# Patient Record
Sex: FEMALE | Race: OTHER | HISPANIC OR LATINO
[De-identification: names, ages, dates, MRNs, and addresses within clinical notes are randomized per-mention and may not be internally consistent; named-entity substitution may affect disease eponyms.]

---

## 2018-03-21 PROBLEM — Z00.00 ENCOUNTER FOR PREVENTIVE HEALTH EXAMINATION: Status: ACTIVE | Noted: 2018-03-21

## 2018-03-27 ENCOUNTER — APPOINTMENT (OUTPATIENT)
Dept: SURGERY | Facility: CLINIC | Age: 63
End: 2018-03-27
Payer: MEDICAID

## 2018-03-27 VITALS
HEIGHT: 61 IN | BODY MASS INDEX: 44.06 KG/M2 | SYSTOLIC BLOOD PRESSURE: 126 MMHG | TEMPERATURE: 97.6 F | DIASTOLIC BLOOD PRESSURE: 87 MMHG | HEART RATE: 71 BPM | WEIGHT: 233.38 LBS | OXYGEN SATURATION: 97 %

## 2018-03-27 DIAGNOSIS — Z87.891 PERSONAL HISTORY OF NICOTINE DEPENDENCE: ICD-10-CM

## 2018-03-27 DIAGNOSIS — Z72.3 LACK OF PHYSICAL EXERCISE: ICD-10-CM

## 2018-03-27 PROCEDURE — 99204 OFFICE O/P NEW MOD 45 MIN: CPT

## 2018-03-27 RX ORDER — VALSARTAN 40 MG/1
TABLET ORAL
Refills: 0 | Status: ACTIVE | COMMUNITY

## 2018-03-27 RX ORDER — ASPIRIN 81 MG/1
81 TABLET ORAL
Refills: 0 | Status: ACTIVE | COMMUNITY

## 2018-08-14 ENCOUNTER — APPOINTMENT (OUTPATIENT)
Dept: SURGERY | Facility: CLINIC | Age: 63
End: 2018-08-14
Payer: MEDICAID

## 2018-08-14 VITALS
TEMPERATURE: 97.8 F | OXYGEN SATURATION: 96 % | BODY MASS INDEX: 45.12 KG/M2 | DIASTOLIC BLOOD PRESSURE: 87 MMHG | WEIGHT: 239 LBS | HEART RATE: 76 BPM | HEIGHT: 61 IN | SYSTOLIC BLOOD PRESSURE: 136 MMHG

## 2018-08-14 PROCEDURE — 99213 OFFICE O/P EST LOW 20 MIN: CPT

## 2018-08-21 ENCOUNTER — APPOINTMENT (OUTPATIENT)
Dept: SURGERY | Facility: CLINIC | Age: 63
End: 2018-08-21

## 2018-08-28 ENCOUNTER — APPOINTMENT (OUTPATIENT)
Dept: SURGERY | Facility: CLINIC | Age: 63
End: 2018-08-28

## 2018-09-26 ENCOUNTER — OTHER (OUTPATIENT)
Age: 63
End: 2018-09-26

## 2018-11-13 ENCOUNTER — OUTPATIENT (OUTPATIENT)
Dept: OUTPATIENT SERVICES | Facility: HOSPITAL | Age: 63
LOS: 1 days | End: 2018-11-13
Payer: COMMERCIAL

## 2018-11-13 ENCOUNTER — APPOINTMENT (OUTPATIENT)
Dept: SURGERY | Facility: CLINIC | Age: 63
End: 2018-11-13

## 2018-11-13 VITALS — WEIGHT: 242.25 LBS | BODY MASS INDEX: 45.77 KG/M2

## 2018-11-13 PROCEDURE — 71046 X-RAY EXAM CHEST 2 VIEWS: CPT | Mod: 26

## 2018-11-13 PROCEDURE — 71046 X-RAY EXAM CHEST 2 VIEWS: CPT

## 2018-11-29 ENCOUNTER — LABORATORY RESULT (OUTPATIENT)
Age: 63
End: 2018-11-29

## 2018-11-29 ENCOUNTER — APPOINTMENT (OUTPATIENT)
Dept: SURGERY | Facility: CLINIC | Age: 63
End: 2018-11-29
Payer: MEDICAID

## 2018-11-29 VITALS — BODY MASS INDEX: 45.16 KG/M2 | WEIGHT: 239 LBS

## 2018-11-29 PROCEDURE — 97802 MEDICAL NUTRITION INDIV IN: CPT

## 2018-12-07 ENCOUNTER — APPOINTMENT (OUTPATIENT)
Dept: RADIOLOGY | Facility: HOSPITAL | Age: 63
End: 2018-12-07
Payer: MEDICAID

## 2018-12-07 ENCOUNTER — OUTPATIENT (OUTPATIENT)
Dept: OUTPATIENT SERVICES | Facility: HOSPITAL | Age: 63
LOS: 1 days | End: 2018-12-07
Payer: COMMERCIAL

## 2018-12-07 ENCOUNTER — APPOINTMENT (OUTPATIENT)
Dept: ULTRASOUND IMAGING | Facility: HOSPITAL | Age: 63
End: 2018-12-07
Payer: MEDICAID

## 2018-12-07 PROCEDURE — 76700 US EXAM ABDOM COMPLETE: CPT | Mod: 26

## 2018-12-07 PROCEDURE — 74241: CPT | Mod: 26

## 2018-12-07 PROCEDURE — 74241: CPT

## 2018-12-07 PROCEDURE — 76700 US EXAM ABDOM COMPLETE: CPT

## 2019-02-08 ENCOUNTER — APPOINTMENT (OUTPATIENT)
Dept: SURGERY | Facility: CLINIC | Age: 64
End: 2019-02-08
Payer: MEDICAID

## 2019-02-08 VITALS
SYSTOLIC BLOOD PRESSURE: 130 MMHG | WEIGHT: 241 LBS | BODY MASS INDEX: 45.5 KG/M2 | DIASTOLIC BLOOD PRESSURE: 88 MMHG | HEART RATE: 74 BPM | HEIGHT: 61 IN | OXYGEN SATURATION: 96 % | TEMPERATURE: 98.1 F

## 2019-02-08 PROCEDURE — 99213 OFFICE O/P EST LOW 20 MIN: CPT

## 2019-02-08 NOTE — ASSESSMENT
[FreeTextEntry1] : 62 y/o F with history of asthma, hypertension,  HLD, and VSG 8 years ago. Patient states she has completed the work-up for revision. Will review patient's work-up. Discussred surgical options with patient including conversion to gastric bypass and conversion to DS. Risk, benefits, and alternatives to the proposed procedures were discussed with the patient and all questions were answered to their satisfaction. She would like to proceed with gastric bypass. \par

## 2019-02-08 NOTE — HISTORY OF PRESENT ILLNESS
[de-identified] : 62 y/o F with history of asthma, hypertension,  HLD, and VSG 8 years ago, in the process of completing work-up for revision due to weight gain.  Patient states she has completed her bariatric work-up. She is feeling well. No medical complaints at this time.

## 2019-02-08 NOTE — END OF VISIT
[FreeTextEntry3] : All medical record entries made by the Scribe were at my, Dr. Sanders's direction and personally dictated by me on 2/8/2019. I have reviewed the chart and agree that the record accurately reflects my personal performance of the history, physical exam, assessment and plan. I have also personally directed, reviewed, and agreed with the chart.

## 2019-04-16 ENCOUNTER — APPOINTMENT (OUTPATIENT)
Dept: SURGERY | Facility: CLINIC | Age: 64
End: 2019-04-16
Payer: MEDICAID

## 2019-04-16 VITALS
HEIGHT: 61 IN | SYSTOLIC BLOOD PRESSURE: 136 MMHG | OXYGEN SATURATION: 97 % | DIASTOLIC BLOOD PRESSURE: 86 MMHG | HEART RATE: 76 BPM | TEMPERATURE: 98.8 F | BODY MASS INDEX: 45.74 KG/M2 | WEIGHT: 242.25 LBS

## 2019-04-16 PROCEDURE — 99213 OFFICE O/P EST LOW 20 MIN: CPT

## 2019-04-17 NOTE — HISTORY OF PRESENT ILLNESS
[de-identified] : Pt is a 63 y/o F with pmhx of prediabetes, HTN, with weight regain who presents today feeling well. Pt states that she has completed all of her workup process for surgery and her weigh ins. Pt is ready to be scheduled for conversion of sleeve to bypass surgery. Pt is to attend our pre op class in 2 days. All Bariatric work-up has been completed and revised, surgery scheduled for 5/09/19.

## 2019-04-17 NOTE — PLAN
[FreeTextEntry1] : Pt is to attend pre op class in 2 days. Scheduled for conversion of sleeve to bypass on 5/09/19. All Bariatric work-up is complete.

## 2019-05-16 ENCOUNTER — APPOINTMENT (OUTPATIENT)
Dept: BARIATRICS | Facility: CLINIC | Age: 64
End: 2019-05-16

## 2019-05-16 VITALS — WEIGHT: 246.38 LBS | HEIGHT: 61 IN | BODY MASS INDEX: 46.52 KG/M2

## 2019-05-28 ENCOUNTER — APPOINTMENT (OUTPATIENT)
Dept: SURGERY | Facility: CLINIC | Age: 64
End: 2019-05-28
Payer: MEDICAID

## 2019-05-28 VITALS
HEART RATE: 82 BPM | BODY MASS INDEX: 45.57 KG/M2 | DIASTOLIC BLOOD PRESSURE: 86 MMHG | WEIGHT: 241.38 LBS | HEIGHT: 61 IN | TEMPERATURE: 97.8 F | OXYGEN SATURATION: 96 % | SYSTOLIC BLOOD PRESSURE: 119 MMHG

## 2019-05-28 DIAGNOSIS — J45.909 UNSPECIFIED ASTHMA, UNCOMPLICATED: ICD-10-CM

## 2019-05-28 PROCEDURE — 99213 OFFICE O/P EST LOW 20 MIN: CPT

## 2019-05-28 NOTE — END OF VISIT
[FreeTextEntry3] : All medical record entries made by the Scribe were at my, Dr. Sanders's direction and personally dictated by me on 05/28/2019  I have reviewed the chart and agree that the record accurately reflects my personal performance of the history, physical exam, assessment and plan. I have also personally directed, reviewed, and agreed with the chart.\par

## 2019-05-28 NOTE — ADDENDUM
[FreeTextEntry1] : Documented by Geno Quiroga acting as a scribe for Dr. Dawit Sanders on 05/28/2019\par

## 2019-05-28 NOTE — ASSESSMENT
[FreeTextEntry1] : 63 y/o F with pmhx of prediabetes, asthma, HLD, HTN, and weight regain s/p VSG 8 years ago presents here with questions prior to bariatric surgery. She is doing well. Explained to patient that the excess fat on her extremities should not be a priority concern and that her central obesity is a larger and more concerning issue that should be dealt with. Pt was given the option of DS or gastric bypass however, DS was not recommended due to high risk of malnutrition from malabsorption. Gastric bypass is more suitable for the pt given her multiple comorbidities with the addition of lower risks for post-op complications like malabsorption. Pt will also see a pulmonologist to assess if her lung function is suitable to undergo surgery. Will reschedule her bariatric surgery.

## 2019-05-28 NOTE — HISTORY OF PRESENT ILLNESS
[de-identified] : 65 y/o F with pmhx of prediabetes,, asthma, HLD, HTN, and weight regain s/p VSG 8 years ago presents here with questions prior to bariatric surgery. She reports doing well. She has also finished all of her work up. Pt has questions regarding the excess fat on her extremities and was wondering if the surgery would help reduce the fat on her extremities. Pt also has concerns about wheezing/ breathing problems post op as she experienced s/p VSG 8 years ago.

## 2019-06-18 ENCOUNTER — APPOINTMENT (OUTPATIENT)
Age: 64
End: 2019-06-18
Payer: MEDICAID

## 2019-06-18 VITALS
DIASTOLIC BLOOD PRESSURE: 77 MMHG | OXYGEN SATURATION: 96 % | BODY MASS INDEX: 46.53 KG/M2 | TEMPERATURE: 98.4 F | HEIGHT: 61 IN | WEIGHT: 246.44 LBS | SYSTOLIC BLOOD PRESSURE: 113 MMHG | HEART RATE: 69 BPM

## 2019-06-18 PROCEDURE — 99213 OFFICE O/P EST LOW 20 MIN: CPT

## 2019-06-18 NOTE — END OF VISIT
[FreeTextEntry3] : All medical record entries made by the Scribe were at my, Dr. Sanders's direction and personally dictated by me on 06/18/2019  I have reviewed the chart and agree that the record accurately reflects my personal performance of the history, physical exam, assessment and plan. I have also personally directed, reviewed, and agreed with the chart.\par \par

## 2019-06-18 NOTE — ASSESSMENT
[FreeTextEntry1] : 63 y/o F with pmhx of prediabetes, asthma, HLD, HTN, and weight regain s/p VSG 8 years ago presents here for final visit prior to surgery. She has completed all of her work up and weigh ins. She has obtained pulmonary clearance and presents today with medical clearance from her PCP. She is ready to be scheduled for Sleeve conversion to bypass.

## 2019-06-18 NOTE — ADDENDUM
[FreeTextEntry1] : Documented by Geno Quiroga acting as a scribe for Dr. Dawit Sanders on 06/18/2019\par

## 2019-06-18 NOTE — HISTORY OF PRESENT ILLNESS
[de-identified] : 65 y/o F with pmhx of prediabetes, asthma, HLD, HTN, and weight regain s/p VSG 8 years ago presents here for a final visit. She reports feeling well today. She presents to the clinic with a letter of support from her PCP. She is ready to be scheduled for bariatric surgery.

## 2019-06-21 VITALS
OXYGEN SATURATION: 95 % | WEIGHT: 240.97 LBS | DIASTOLIC BLOOD PRESSURE: 93 MMHG | HEART RATE: 82 BPM | SYSTOLIC BLOOD PRESSURE: 156 MMHG | HEIGHT: 61 IN | TEMPERATURE: 98 F | RESPIRATION RATE: 18 BRPM

## 2019-06-21 NOTE — PATIENT PROFILE ADULT - NSPROHMRESPMGMTSTRAT_GEN_A_NUR
Name Of Product 3: Melasa Emulsion 8%.  Lot# [id number]. Exp. 11/19/17 Name Of Product 3: Melasa Emulsion 8%.  Lot# [id number]. Exp. 11/19/17 CPAP

## 2019-06-24 ENCOUNTER — RESULT REVIEW (OUTPATIENT)
Age: 64
End: 2019-06-24

## 2019-06-24 ENCOUNTER — INPATIENT (INPATIENT)
Facility: HOSPITAL | Age: 64
LOS: 4 days | Discharge: HOME | DRG: 620 | End: 2019-06-29
Attending: SURGERY | Admitting: SURGERY
Payer: MEDICAID

## 2019-06-24 ENCOUNTER — APPOINTMENT (OUTPATIENT)
Age: 64
End: 2019-06-24
Payer: MEDICAID

## 2019-06-24 DIAGNOSIS — Z90.3 ACQUIRED ABSENCE OF STOMACH [PART OF]: Chronic | ICD-10-CM

## 2019-06-24 DIAGNOSIS — Z98.890 OTHER SPECIFIED POSTPROCEDURAL STATES: Chronic | ICD-10-CM

## 2019-06-24 DIAGNOSIS — K43.2 INCISIONAL HERNIA WITHOUT OBSTRUCTION OR GANGRENE: ICD-10-CM

## 2019-06-24 LAB
HCT VFR BLD CALC: 40.2 % — SIGNIFICANT CHANGE UP (ref 34.5–45)
HGB BLD-MCNC: 12.5 G/DL — SIGNIFICANT CHANGE UP (ref 11.5–15.5)
MCHC RBC-ENTMCNC: 28.5 PG — SIGNIFICANT CHANGE UP (ref 27–34)
MCHC RBC-ENTMCNC: 31.1 GM/DL — LOW (ref 32–36)
MCV RBC AUTO: 91.6 FL — SIGNIFICANT CHANGE UP (ref 80–100)
NRBC # BLD: 0 /100 WBCS — SIGNIFICANT CHANGE UP (ref 0–0)
PLATELET # BLD AUTO: 223 K/UL — SIGNIFICANT CHANGE UP (ref 150–400)
RBC # BLD: 4.39 M/UL — SIGNIFICANT CHANGE UP (ref 3.8–5.2)
RBC # FLD: 12.4 % — SIGNIFICANT CHANGE UP (ref 10.3–14.5)
WBC # BLD: 13.86 K/UL — HIGH (ref 3.8–10.5)
WBC # FLD AUTO: 13.86 K/UL — HIGH (ref 3.8–10.5)

## 2019-06-24 PROCEDURE — S2900 ROBOTIC SURGICAL SYSTEM: CPT | Mod: NC

## 2019-06-24 PROCEDURE — 43645 LAP GASTR BYPASS INCL SMLL I: CPT | Mod: GC

## 2019-06-24 RX ORDER — IPRATROPIUM/ALBUTEROL SULFATE 18-103MCG
3 AEROSOL WITH ADAPTER (GRAM) INHALATION EVERY 6 HOURS
Refills: 0 | Status: DISCONTINUED | OUTPATIENT
Start: 2019-06-24 | End: 2019-06-29

## 2019-06-24 RX ORDER — SODIUM CHLORIDE 9 MG/ML
1000 INJECTION, SOLUTION INTRAVENOUS
Refills: 0 | Status: DISCONTINUED | OUTPATIENT
Start: 2019-06-24 | End: 2019-06-26

## 2019-06-24 RX ORDER — PANTOPRAZOLE SODIUM 20 MG/1
40 TABLET, DELAYED RELEASE ORAL DAILY
Refills: 0 | Status: DISCONTINUED | OUTPATIENT
Start: 2019-06-24 | End: 2019-06-29

## 2019-06-24 RX ORDER — ENOXAPARIN SODIUM 100 MG/ML
40 INJECTION SUBCUTANEOUS EVERY 12 HOURS
Refills: 0 | Status: DISCONTINUED | OUTPATIENT
Start: 2019-06-24 | End: 2019-06-26

## 2019-06-24 RX ORDER — ACETAMINOPHEN 500 MG
1000 TABLET ORAL ONCE
Refills: 0 | Status: COMPLETED | OUTPATIENT
Start: 2019-06-24 | End: 2019-06-25

## 2019-06-24 RX ORDER — ENOXAPARIN SODIUM 100 MG/ML
40 INJECTION SUBCUTANEOUS ONCE
Refills: 0 | Status: COMPLETED | OUTPATIENT
Start: 2019-06-24 | End: 2019-06-24

## 2019-06-24 RX ORDER — ALBUTEROL 90 UG/1
1 AEROSOL, METERED ORAL EVERY 4 HOURS
Refills: 0 | Status: DISCONTINUED | OUTPATIENT
Start: 2019-06-24 | End: 2019-06-29

## 2019-06-24 RX ORDER — ACETAMINOPHEN 500 MG
1000 TABLET ORAL ONCE
Refills: 0 | Status: COMPLETED | OUTPATIENT
Start: 2019-06-25 | End: 2019-06-25

## 2019-06-24 RX ORDER — HYDROMORPHONE HYDROCHLORIDE 2 MG/ML
0.5 INJECTION INTRAMUSCULAR; INTRAVENOUS; SUBCUTANEOUS
Refills: 0 | Status: DISCONTINUED | OUTPATIENT
Start: 2019-06-24 | End: 2019-06-28

## 2019-06-24 RX ORDER — HYDROMORPHONE HYDROCHLORIDE 2 MG/ML
0.5 INJECTION INTRAMUSCULAR; INTRAVENOUS; SUBCUTANEOUS
Refills: 0 | Status: DISCONTINUED | OUTPATIENT
Start: 2019-06-24 | End: 2019-06-24

## 2019-06-24 RX ORDER — ONDANSETRON 8 MG/1
4 TABLET, FILM COATED ORAL EVERY 6 HOURS
Refills: 0 | Status: DISCONTINUED | OUTPATIENT
Start: 2019-06-24 | End: 2019-06-29

## 2019-06-24 RX ORDER — SCOPALAMINE 1 MG/3D
1 PATCH, EXTENDED RELEASE TRANSDERMAL ONCE
Refills: 0 | Status: COMPLETED | OUTPATIENT
Start: 2019-06-24 | End: 2019-06-24

## 2019-06-24 RX ADMIN — HYDROMORPHONE HYDROCHLORIDE 0.5 MILLIGRAM(S): 2 INJECTION INTRAMUSCULAR; INTRAVENOUS; SUBCUTANEOUS at 20:44

## 2019-06-24 RX ADMIN — ENOXAPARIN SODIUM 40 MILLIGRAM(S): 100 INJECTION SUBCUTANEOUS at 19:08

## 2019-06-24 RX ADMIN — HYDROMORPHONE HYDROCHLORIDE 0.5 MILLIGRAM(S): 2 INJECTION INTRAMUSCULAR; INTRAVENOUS; SUBCUTANEOUS at 21:05

## 2019-06-24 RX ADMIN — PANTOPRAZOLE SODIUM 40 MILLIGRAM(S): 20 TABLET, DELAYED RELEASE ORAL at 16:54

## 2019-06-24 RX ADMIN — ENOXAPARIN SODIUM 40 MILLIGRAM(S): 100 INJECTION SUBCUTANEOUS at 08:14

## 2019-06-24 RX ADMIN — SCOPALAMINE 1 PATCH: 1 PATCH, EXTENDED RELEASE TRANSDERMAL at 19:09

## 2019-06-24 RX ADMIN — SCOPALAMINE 1 PATCH: 1 PATCH, EXTENDED RELEASE TRANSDERMAL at 08:14

## 2019-06-24 RX ADMIN — Medication 3 MILLILITER(S): at 18:14

## 2019-06-24 NOTE — BRIEF OPERATIVE NOTE - NSICDXBRIEFPROCEDURE_GEN_ALL_CORE_FT
PROCEDURES:  Repair, hernia, incisional, open, adult 24-Jun-2019 13:07:17  Ryan Petit  Robot-assisted laparoscopic Seamus-en-Y gastric bypass 24-Jun-2019 13:06:54 revision of sleeve gastrectomy Ryan Petit

## 2019-06-24 NOTE — BRIEF OPERATIVE NOTE - OPERATION/FINDINGS
Findings of sleeve gastrectomy with adhesions to left liver lobe and diaphragm. Gastric pouch created over 32 Fr bougie using blue load robotic stapler. Fundus resected. Hiatus closed with permanent suture. Ante-colic, retro-gastric gastrojejunostomy done in two layers hand sewn with 2-0 Ethibond. Seamus limb 150 cm, BP limb 150 cm, mesenteric defects closed with permanent sutures. Negative leak test.   Incisional hernia at umbilical port site identified and repaired primarily with 0-Vicryl. Contents - incarcerated omentum - resected.

## 2019-06-24 NOTE — PROGRESS NOTE ADULT - SUBJECTIVE AND OBJECTIVE BOX
POST-OPERATIVE NOTE    Procedure: Robot-assisted laparoscopic Seamus-en-Y gastric bypass  revision of sleeve gastrectomy     Diagnosis/Indication: Morbid obesity     Surgeon: Dr. Sanders    S: Pt has no complaints. Denies CP, SOB, MUSA, calf tenderness. Pain controlled with medication.    O:  T(C): 37 (06-24-19 @ 13:00), Max: 37 (06-24-19 @ 13:00)  T(F): 98.6 (06-24-19 @ 13:00), Max: 98.6 (06-24-19 @ 13:00)  HR: 60 (06-24-19 @ 16:00) (60 - 80)  BP: 130/74 (06-24-19 @ 16:00) (95/60 - 130/74)  RR: 20 (06-24-19 @ 16:00) (10 - 27)  SpO2: 97% (06-24-19 @ 16:00) (90% - 97%)  Wt(kg): --            Gen: NAD, resting comfortably in bed  C/V: NSR  Pulm: Nonlabored breathing, no respiratory distress  Abd: soft, non distended , TTP around incision site , incision clean dry and intact   Extrem: WWP, no calf edema, SCDs in place

## 2019-06-24 NOTE — PROGRESS NOTE ADULT - ASSESSMENT
63 yo F with morbid obesity - BMI 46, and associated pre-diabetes, HLD, HTN, asthma, and history of sleeve gastrectomy ~ 2011, scheduled for conversion to gastric bypass. Patient s/p Robot-assisted laparoscopic Seamus-en-Y gastric bypass  revision of sleeve gastrectomy   -Pain/nausea control  -BCLD, IVF  -Protonix  -CBC 6hrs post-op   -Lovenox DVT ppx   -SCDs, OOB/A, IS  -AM labs  -Nutritional consult in AM

## 2019-06-24 NOTE — H&P ADULT - HISTORY OF PRESENT ILLNESS
65 yo F with morbid obesity - BMI 46, and associated pre-diabetes, HLD, HTN, asthma, and history of sleeve gastrectomy ~ 2011, scheduled for conversion to gastric bypass

## 2019-06-24 NOTE — BRIEF OPERATIVE NOTE - NSICDXBRIEFPOSTOP_GEN_ALL_CORE_FT
POST-OP DIAGNOSIS:  Incisional hernia 24-Jun-2019 13:08:14  Ryan Petit  Morbid obesity 24-Jun-2019 13:07:46  Ryan Petit

## 2019-06-25 LAB
ANION GAP SERPL CALC-SCNC: 9 MMOL/L — SIGNIFICANT CHANGE UP (ref 5–17)
BASOPHILS # BLD AUTO: 0.01 K/UL — SIGNIFICANT CHANGE UP (ref 0–0.2)
BASOPHILS NFR BLD AUTO: 0.1 % — SIGNIFICANT CHANGE UP (ref 0–2)
BUN SERPL-MCNC: 10 MG/DL — SIGNIFICANT CHANGE UP (ref 7–23)
CALCIUM SERPL-MCNC: 8.8 MG/DL — SIGNIFICANT CHANGE UP (ref 8.4–10.5)
CHLORIDE SERPL-SCNC: 101 MMOL/L — SIGNIFICANT CHANGE UP (ref 96–108)
CO2 SERPL-SCNC: 32 MMOL/L — HIGH (ref 22–31)
CREAT SERPL-MCNC: 0.48 MG/DL — LOW (ref 0.5–1.3)
EOSINOPHIL # BLD AUTO: 0 K/UL — SIGNIFICANT CHANGE UP (ref 0–0.5)
EOSINOPHIL NFR BLD AUTO: 0 % — SIGNIFICANT CHANGE UP (ref 0–6)
GLUCOSE SERPL-MCNC: 129 MG/DL — HIGH (ref 70–99)
HCT VFR BLD CALC: 36 % — SIGNIFICANT CHANGE UP (ref 34.5–45)
HCV AB S/CO SERPL IA: 0.04 S/CO — SIGNIFICANT CHANGE UP
HCV AB SERPL-IMP: SIGNIFICANT CHANGE UP
HGB BLD-MCNC: 11.3 G/DL — LOW (ref 11.5–15.5)
IMM GRANULOCYTES NFR BLD AUTO: 0.5 % — SIGNIFICANT CHANGE UP (ref 0–1.5)
LYMPHOCYTES # BLD AUTO: 0.99 K/UL — LOW (ref 1–3.3)
LYMPHOCYTES # BLD AUTO: 9.4 % — LOW (ref 13–44)
MAGNESIUM SERPL-MCNC: 2.2 MG/DL — SIGNIFICANT CHANGE UP (ref 1.6–2.6)
MCHC RBC-ENTMCNC: 28.8 PG — SIGNIFICANT CHANGE UP (ref 27–34)
MCHC RBC-ENTMCNC: 31.4 GM/DL — LOW (ref 32–36)
MCV RBC AUTO: 91.6 FL — SIGNIFICANT CHANGE UP (ref 80–100)
MONOCYTES # BLD AUTO: 1.34 K/UL — HIGH (ref 0–0.9)
MONOCYTES NFR BLD AUTO: 12.7 % — SIGNIFICANT CHANGE UP (ref 2–14)
NEUTROPHILS # BLD AUTO: 8.14 K/UL — HIGH (ref 1.8–7.4)
NEUTROPHILS NFR BLD AUTO: 77.3 % — HIGH (ref 43–77)
NRBC # BLD: 0 /100 WBCS — SIGNIFICANT CHANGE UP (ref 0–0)
PHOSPHATE SERPL-MCNC: 2.9 MG/DL — SIGNIFICANT CHANGE UP (ref 2.5–4.5)
PLATELET # BLD AUTO: 197 K/UL — SIGNIFICANT CHANGE UP (ref 150–400)
POTASSIUM SERPL-MCNC: 3.3 MMOL/L — LOW (ref 3.5–5.3)
POTASSIUM SERPL-SCNC: 3.3 MMOL/L — LOW (ref 3.5–5.3)
RBC # BLD: 3.93 M/UL — SIGNIFICANT CHANGE UP (ref 3.8–5.2)
RBC # FLD: 12.4 % — SIGNIFICANT CHANGE UP (ref 10.3–14.5)
SODIUM SERPL-SCNC: 142 MMOL/L — SIGNIFICANT CHANGE UP (ref 135–145)
WBC # BLD: 10.53 K/UL — HIGH (ref 3.8–10.5)
WBC # FLD AUTO: 10.53 K/UL — HIGH (ref 3.8–10.5)

## 2019-06-25 PROCEDURE — 74241: CPT | Mod: 26

## 2019-06-25 RX ORDER — POTASSIUM CHLORIDE 20 MEQ
40 PACKET (EA) ORAL ONCE
Refills: 0 | Status: COMPLETED | OUTPATIENT
Start: 2019-06-25 | End: 2019-06-25

## 2019-06-25 RX ORDER — POTASSIUM CHLORIDE 20 MEQ
10 PACKET (EA) ORAL
Refills: 0 | Status: COMPLETED | OUTPATIENT
Start: 2019-06-25 | End: 2019-06-25

## 2019-06-25 RX ORDER — ACETAMINOPHEN 500 MG
650 TABLET ORAL ONCE
Refills: 0 | Status: COMPLETED | OUTPATIENT
Start: 2019-06-25 | End: 2019-06-25

## 2019-06-25 RX ORDER — ACETAMINOPHEN 500 MG
1000 TABLET ORAL ONCE
Refills: 0 | Status: COMPLETED | OUTPATIENT
Start: 2019-06-26 | End: 2019-06-26

## 2019-06-25 RX ORDER — KETOROLAC TROMETHAMINE 30 MG/ML
15 SYRINGE (ML) INJECTION EVERY 6 HOURS
Refills: 0 | Status: DISCONTINUED | OUTPATIENT
Start: 2019-06-25 | End: 2019-06-28

## 2019-06-25 RX ORDER — LOSARTAN POTASSIUM 100 MG/1
100 TABLET, FILM COATED ORAL DAILY
Refills: 0 | Status: DISCONTINUED | OUTPATIENT
Start: 2019-06-25 | End: 2019-06-26

## 2019-06-25 RX ADMIN — Medication 3 MILLILITER(S): at 21:00

## 2019-06-25 RX ADMIN — Medication 3 MILLILITER(S): at 01:02

## 2019-06-25 RX ADMIN — Medication 100 MILLIEQUIVALENT(S): at 13:07

## 2019-06-25 RX ADMIN — LOSARTAN POTASSIUM 100 MILLIGRAM(S): 100 TABLET, FILM COATED ORAL at 13:06

## 2019-06-25 RX ADMIN — ENOXAPARIN SODIUM 40 MILLIGRAM(S): 100 INJECTION SUBCUTANEOUS at 06:01

## 2019-06-25 RX ADMIN — Medication 400 MILLIGRAM(S): at 01:01

## 2019-06-25 RX ADMIN — Medication 650 MILLIGRAM(S): at 19:15

## 2019-06-25 RX ADMIN — Medication 15 MILLIGRAM(S): at 23:48

## 2019-06-25 RX ADMIN — Medication 650 MILLIGRAM(S): at 18:40

## 2019-06-25 RX ADMIN — Medication 15 MILLIGRAM(S): at 23:18

## 2019-06-25 RX ADMIN — Medication 100 MILLIEQUIVALENT(S): at 14:30

## 2019-06-25 RX ADMIN — SCOPALAMINE 1 PATCH: 1 PATCH, EXTENDED RELEASE TRANSDERMAL at 18:13

## 2019-06-25 RX ADMIN — Medication 15 MILLIGRAM(S): at 12:40

## 2019-06-25 RX ADMIN — HYDROMORPHONE HYDROCHLORIDE 0.5 MILLIGRAM(S): 2 INJECTION INTRAMUSCULAR; INTRAVENOUS; SUBCUTANEOUS at 07:13

## 2019-06-25 RX ADMIN — Medication 1000 MILLIGRAM(S): at 01:25

## 2019-06-25 RX ADMIN — Medication 3 MILLILITER(S): at 06:01

## 2019-06-25 RX ADMIN — HYDROMORPHONE HYDROCHLORIDE 0.5 MILLIGRAM(S): 2 INJECTION INTRAMUSCULAR; INTRAVENOUS; SUBCUTANEOUS at 06:55

## 2019-06-25 RX ADMIN — Medication 40 MILLIEQUIVALENT(S): at 13:12

## 2019-06-25 RX ADMIN — SODIUM CHLORIDE 125 MILLILITER(S): 9 INJECTION, SOLUTION INTRAVENOUS at 21:47

## 2019-06-25 RX ADMIN — Medication 15 MILLIGRAM(S): at 12:05

## 2019-06-25 RX ADMIN — Medication 100 MILLIEQUIVALENT(S): at 11:07

## 2019-06-25 RX ADMIN — Medication 400 MILLIGRAM(S): at 23:18

## 2019-06-25 RX ADMIN — SCOPALAMINE 1 PATCH: 1 PATCH, EXTENDED RELEASE TRANSDERMAL at 06:05

## 2019-06-25 RX ADMIN — Medication 3 MILLILITER(S): at 13:08

## 2019-06-25 RX ADMIN — ENOXAPARIN SODIUM 40 MILLIGRAM(S): 100 INJECTION SUBCUTANEOUS at 19:40

## 2019-06-25 RX ADMIN — Medication 1000 MILLIGRAM(S): at 23:48

## 2019-06-25 RX ADMIN — PANTOPRAZOLE SODIUM 40 MILLIGRAM(S): 20 TABLET, DELAYED RELEASE ORAL at 13:06

## 2019-06-25 NOTE — DIETITIAN INITIAL EVALUATION ADULT. - ADD RECOMMEND
Bariatric PO diet progression/advancement. F/u with [facility] Bariatric Outpatient RD.

## 2019-06-25 NOTE — PROGRESS NOTE ADULT - ASSESSMENT
65 yo F with morbid obesity - BMI 46, and associated pre-diabetes, HLD, HTN, asthma, and history of sleeve gastrectomy ~ 2011, scheduled for conversion to gastric bypass. Patient s/p Robot-assisted laparoscopic Seamus-en-Y gastric bypass  revision of sleeve gastrectomy   -Pain/nausea control  -NPO, IVF  -Protonix  -CBC 6hrs post-op   -Lovenox DVT ppx   -SCDs, OOB/A, IS  -AM labs  -Nutritional consult in AM

## 2019-06-25 NOTE — DIETITIAN INITIAL EVALUATION ADULT. - OTHER INFO
64 y.o Morbid Obesity F from home with PMHx of pre-DM, HLD, HTN, Asthma, h/o Sleeve Gastrectomy 2011, s/p open repair hernia incisional, robot-assisted laparoscopic seamus-en-Y gastric bypass revision of sleeve gastrectomy 6/24. Pt was following regular diet, NKFA,  lbs per pt. Pt has protein shake/supplements and MVI at home. Pt started BRCLD, tolerating sips thus far well. Denies N/V/D/C or pain. +BM 6/21, awaiting BM. questionable flatus, not ambulating yet. Skin intact with surgical incisions. Hypokalemia 3.3 L, receiving LR noted. Nutrition edu provided to pt on Bariatric Seamus-En-Y diet progression with handouts in [place] language. Discussed tips/strategies with PO diet progression, hydration, protein/vitamins/minerals supplements. Pt verbalized understanding, no questions at this time, fair to good compliance expected. Pt to be f/u with [facility] Bariatric Outpatient RD. RD will f/u per high risk protocol.

## 2019-06-25 NOTE — CHART NOTE - NSCHARTNOTEFT_GEN_A_CORE
Upon Nutritional Assessment by the Registered Dietitian your patient was determined to meet criteria / has evidence of the following diagnosis/diagnoses:          [ ]  Mild Protein Calorie Malnutrition        [ ]  Moderate Protein Calorie Malnutrition        [ ] Severe Protein Calorie Malnutrition        [ ] Unspecified Protein Calorie Malnutrition        [ ] Underweight / BMI <19        [X] Morbid Obesity / BMI > 40      Findings as based on:  •  Comprehensive nutrition assessment and consultation    Treatment:    The following diet has been recommended:  Refer to initial nutrition assessment note    PROVIDER Section:     By signing this assessment you are acknowledging and agree with the diagnosis/diagnoses assigned by the Registered Dietitian    Mar Olsen RDN, MANUELAN, [facility]   Ext: [phone] or available via [employer]

## 2019-06-25 NOTE — PROGRESS NOTE ADULT - SUBJECTIVE AND OBJECTIVE BOX
STATUS POST:  Robot-assisted laparoscopic Seamus-en-Y gastric bypass  revision of sleeve gastrectomy      SUBJECTIVE: Patient seen and examined bedside by chief resident. complains of alot of abdominal pain. didnt sleep much due to pain     enoxaparin Injectable 40 milliGRAM(s) SubCutaneous every 12 hours      Vital Signs Last 24 Hrs  T(C): 36.9 (25 Jun 2019 05:04), Max: 37.6 (24 Jun 2019 18:08)  T(F): 98.4 (25 Jun 2019 05:04), Max: 99.6 (24 Jun 2019 18:08)  HR: 80 (25 Jun 2019 05:04) (60 - 80)  BP: 152/105 (25 Jun 2019 05:04) (95/60 - 152/105)  BP(mean): 101 (24 Jun 2019 17:00) (74 - 110)  RR: 18 (25 Jun 2019 05:04) (10 - 27)  SpO2: 99% (25 Jun 2019 05:04) (90% - 99%)  I&O's Detail    24 Jun 2019 07:01  -  25 Jun 2019 07:00  --------------------------------------------------------  IN:    lactated ringers.: 1750 mL  Total IN: 1750 mL    OUT:    Indwelling Catheter - Urethral: 835 mL  Total OUT: 835 mL    Total NET: 915 mL          General: NAD, resting comfortably in bed  C/V: NSR  Pulm: Nonlabored breathing, no respiratory distress  Abd: soft, mild distention, very TTP, incision is clean, dry and intact   Extrem: WWP, no edema, SCDs in place        LABS:                        12.5   13.86 )-----------( 223      ( 24 Jun 2019 18:27 )             40.2                 RADIOLOGY & ADDITIONAL STUDIES:

## 2019-06-26 LAB
ANION GAP SERPL CALC-SCNC: 11 MMOL/L — SIGNIFICANT CHANGE UP (ref 5–17)
BUN SERPL-MCNC: 15 MG/DL — SIGNIFICANT CHANGE UP (ref 7–23)
CALCIUM SERPL-MCNC: 8.6 MG/DL — SIGNIFICANT CHANGE UP (ref 8.4–10.5)
CHLORIDE SERPL-SCNC: 103 MMOL/L — SIGNIFICANT CHANGE UP (ref 96–108)
CO2 SERPL-SCNC: 27 MMOL/L — SIGNIFICANT CHANGE UP (ref 22–31)
CREAT SERPL-MCNC: 0.95 MG/DL — SIGNIFICANT CHANGE UP (ref 0.5–1.3)
GLUCOSE SERPL-MCNC: 95 MG/DL — SIGNIFICANT CHANGE UP (ref 70–99)
HCT VFR BLD CALC: 31.3 % — LOW (ref 34.5–45)
HCT VFR BLD CALC: 32.3 % — LOW (ref 34.5–45)
HGB BLD-MCNC: 10.1 G/DL — LOW (ref 11.5–15.5)
HGB BLD-MCNC: 9.6 G/DL — LOW (ref 11.5–15.5)
MAGNESIUM SERPL-MCNC: 2 MG/DL — SIGNIFICANT CHANGE UP (ref 1.6–2.6)
MCHC RBC-ENTMCNC: 28.5 PG — SIGNIFICANT CHANGE UP (ref 27–34)
MCHC RBC-ENTMCNC: 29.1 PG — SIGNIFICANT CHANGE UP (ref 27–34)
MCHC RBC-ENTMCNC: 30.7 GM/DL — LOW (ref 32–36)
MCHC RBC-ENTMCNC: 31.3 GM/DL — LOW (ref 32–36)
MCV RBC AUTO: 92.9 FL — SIGNIFICANT CHANGE UP (ref 80–100)
MCV RBC AUTO: 93.1 FL — SIGNIFICANT CHANGE UP (ref 80–100)
NRBC # BLD: 0 /100 WBCS — SIGNIFICANT CHANGE UP (ref 0–0)
NRBC # BLD: 0 /100 WBCS — SIGNIFICANT CHANGE UP (ref 0–0)
PHOSPHATE SERPL-MCNC: 1.8 MG/DL — LOW (ref 2.5–4.5)
PLATELET # BLD AUTO: 145 K/UL — LOW (ref 150–400)
PLATELET # BLD AUTO: 158 K/UL — SIGNIFICANT CHANGE UP (ref 150–400)
POTASSIUM SERPL-MCNC: 3.7 MMOL/L — SIGNIFICANT CHANGE UP (ref 3.5–5.3)
POTASSIUM SERPL-SCNC: 3.7 MMOL/L — SIGNIFICANT CHANGE UP (ref 3.5–5.3)
RBC # BLD: 3.37 M/UL — LOW (ref 3.8–5.2)
RBC # BLD: 3.47 M/UL — LOW (ref 3.8–5.2)
RBC # FLD: 12.6 % — SIGNIFICANT CHANGE UP (ref 10.3–14.5)
RBC # FLD: 12.6 % — SIGNIFICANT CHANGE UP (ref 10.3–14.5)
SODIUM SERPL-SCNC: 141 MMOL/L — SIGNIFICANT CHANGE UP (ref 135–145)
WBC # BLD: 11.25 K/UL — HIGH (ref 3.8–10.5)
WBC # BLD: 15.4 K/UL — HIGH (ref 3.8–10.5)
WBC # FLD AUTO: 11.25 K/UL — HIGH (ref 3.8–10.5)
WBC # FLD AUTO: 15.4 K/UL — HIGH (ref 3.8–10.5)

## 2019-06-26 RX ORDER — ASPIRIN/CALCIUM CARB/MAGNESIUM 324 MG
1 TABLET ORAL
Qty: 0 | Refills: 0 | DISCHARGE

## 2019-06-26 RX ORDER — SODIUM CHLORIDE 9 MG/ML
1000 INJECTION, SOLUTION INTRAVENOUS
Refills: 0 | Status: DISCONTINUED | OUTPATIENT
Start: 2019-06-26 | End: 2019-06-28

## 2019-06-26 RX ORDER — ACETAMINOPHEN 500 MG
1000 TABLET ORAL ONCE
Refills: 0 | Status: COMPLETED | OUTPATIENT
Start: 2019-06-26 | End: 2019-06-26

## 2019-06-26 RX ORDER — ALBUTEROL 90 UG/1
0 AEROSOL, METERED ORAL
Qty: 0 | Refills: 0 | DISCHARGE

## 2019-06-26 RX ORDER — POTASSIUM CHLORIDE 20 MEQ
40 PACKET (EA) ORAL ONCE
Refills: 0 | Status: COMPLETED | OUTPATIENT
Start: 2019-06-26 | End: 2019-06-26

## 2019-06-26 RX ORDER — ENOXAPARIN SODIUM 100 MG/ML
40 INJECTION SUBCUTANEOUS EVERY 12 HOURS
Refills: 0 | Status: DISCONTINUED | OUTPATIENT
Start: 2019-06-26 | End: 2019-06-29

## 2019-06-26 RX ORDER — POTASSIUM PHOSPHATE, MONOBASIC POTASSIUM PHOSPHATE, DIBASIC 236; 224 MG/ML; MG/ML
15 INJECTION, SOLUTION INTRAVENOUS ONCE
Refills: 0 | Status: COMPLETED | OUTPATIENT
Start: 2019-06-26 | End: 2019-06-26

## 2019-06-26 RX ORDER — SODIUM CHLORIDE 9 MG/ML
500 INJECTION, SOLUTION INTRAVENOUS ONCE
Refills: 0 | Status: COMPLETED | OUTPATIENT
Start: 2019-06-26 | End: 2019-06-26

## 2019-06-26 RX ADMIN — Medication 15 MILLIGRAM(S): at 13:57

## 2019-06-26 RX ADMIN — PANTOPRAZOLE SODIUM 40 MILLIGRAM(S): 20 TABLET, DELAYED RELEASE ORAL at 13:56

## 2019-06-26 RX ADMIN — Medication 40 MILLIEQUIVALENT(S): at 10:22

## 2019-06-26 RX ADMIN — Medication 1000 MILLIGRAM(S): at 22:36

## 2019-06-26 RX ADMIN — Medication 400 MILLIGRAM(S): at 06:32

## 2019-06-26 RX ADMIN — Medication 3 MILLILITER(S): at 17:47

## 2019-06-26 RX ADMIN — POTASSIUM PHOSPHATE, MONOBASIC POTASSIUM PHOSPHATE, DIBASIC 63.75 MILLIMOLE(S): 236; 224 INJECTION, SOLUTION INTRAVENOUS at 13:57

## 2019-06-26 RX ADMIN — SODIUM CHLORIDE 1000 MILLILITER(S): 9 INJECTION, SOLUTION INTRAVENOUS at 10:19

## 2019-06-26 RX ADMIN — Medication 15 MILLIGRAM(S): at 23:40

## 2019-06-26 RX ADMIN — ENOXAPARIN SODIUM 40 MILLIGRAM(S): 100 INJECTION SUBCUTANEOUS at 17:46

## 2019-06-26 RX ADMIN — Medication 15 MILLIGRAM(S): at 17:55

## 2019-06-26 RX ADMIN — LOSARTAN POTASSIUM 100 MILLIGRAM(S): 100 TABLET, FILM COATED ORAL at 06:33

## 2019-06-26 RX ADMIN — Medication 3 MILLILITER(S): at 13:57

## 2019-06-26 RX ADMIN — ENOXAPARIN SODIUM 40 MILLIGRAM(S): 100 INJECTION SUBCUTANEOUS at 06:33

## 2019-06-26 RX ADMIN — SODIUM CHLORIDE 150 MILLILITER(S): 9 INJECTION, SOLUTION INTRAVENOUS at 22:06

## 2019-06-26 RX ADMIN — Medication 3 MILLILITER(S): at 23:04

## 2019-06-26 RX ADMIN — SCOPALAMINE 1 PATCH: 1 PATCH, EXTENDED RELEASE TRANSDERMAL at 07:08

## 2019-06-26 RX ADMIN — SODIUM CHLORIDE 125 MILLILITER(S): 9 INJECTION, SOLUTION INTRAVENOUS at 06:32

## 2019-06-26 RX ADMIN — Medication 15 MILLIGRAM(S): at 06:32

## 2019-06-26 RX ADMIN — Medication 3 MILLILITER(S): at 03:02

## 2019-06-26 RX ADMIN — Medication 15 MILLIGRAM(S): at 14:06

## 2019-06-26 RX ADMIN — Medication 15 MILLIGRAM(S): at 07:22

## 2019-06-26 RX ADMIN — Medication 15 MILLIGRAM(S): at 23:04

## 2019-06-26 RX ADMIN — Medication 400 MILLIGRAM(S): at 22:06

## 2019-06-26 RX ADMIN — SODIUM CHLORIDE 150 MILLILITER(S): 9 INJECTION, SOLUTION INTRAVENOUS at 10:20

## 2019-06-26 RX ADMIN — Medication 1000 MILLIGRAM(S): at 07:22

## 2019-06-26 RX ADMIN — Medication 15 MILLIGRAM(S): at 17:46

## 2019-06-26 RX ADMIN — SCOPALAMINE 1 PATCH: 1 PATCH, EXTENDED RELEASE TRANSDERMAL at 17:55

## 2019-06-26 NOTE — PROGRESS NOTE ADULT - ASSESSMENT
65 yo F with morbid obesity - BMI 46, and associated pre-diabetes, HLD, HTN, asthma, and history of sleeve gastrectomy ~ 2011, scheduled for conversion to gastric bypass. Patient s/p Robot-assisted laparoscopic Seamus-en-Y gastric bypass  revision of sleeve gastrectomy   -Pain/nausea control  -BCLD, IVF  -Protonix   -Lovenox DVT ppx   -SCDs, OOB/A, IS  -AM labs  -Nutritional consult in AM

## 2019-06-26 NOTE — PROVIDER CONTACT NOTE (OTHER) - BACKGROUND
63 y/o female s/p robot-assisted laparoscopic Seamus-en-y gastric bypass revision of sleeve gastrectomy

## 2019-06-26 NOTE — PROGRESS NOTE ADULT - SUBJECTIVE AND OBJECTIVE BOX
STATUS POST:  Robot-assisted laparoscopic Seamus-en-Y gastric bypass  revision of sleeve gastrectomy      SUBJECTIVE: Patient seen and examined bedside by chief resident.  tolerating some of BCLD. pain improving. ambulating     enoxaparin Injectable 40 milliGRAM(s) SubCutaneous every 12 hours  losartan 100 milliGRAM(s) Oral daily      Vital Signs Last 24 Hrs  T(C): 37 (26 Jun 2019 05:11), Max: 37.1 (25 Jun 2019 09:50)  T(F): 98.6 (26 Jun 2019 05:11), Max: 98.8 (25 Jun 2019 09:50)  HR: 86 (26 Jun 2019 05:11) (78 - 96)  BP: 118/75 (26 Jun 2019 05:11) (118/75 - 150/98)  BP(mean): --  RR: 19 (26 Jun 2019 05:11) (17 - 23)  SpO2: 99% (26 Jun 2019 05:11) (95% - 100%)  I&O's Detail    25 Jun 2019 07:01  -  26 Jun 2019 07:00  --------------------------------------------------------  IN:    IV PiggyBack: 500 mL    lactated ringers.: 2187.5 mL    Oral Fluid: 1320 mL  Total IN: 4007.5 mL    OUT:    Indwelling Catheter - Urethral: 100 mL    Voided: 1400 mL  Total OUT: 1500 mL    Total NET: 2507.5 mL          General: NAD, resting comfortably in bed  C/V: NSR  Pulm: Nonlabored breathing, no respiratory distress  Abd: soft, NT/ND. incisions are clean, dry and intact   Extrem: WWP, no edema, SCDs in place        LABS:                        9.6    11.25 )-----------( 145      ( 26 Jun 2019 06:40 )             31.3     06-26    141  |  103  |  15  ----------------------------<  95  3.7   |  27  |  x     Ca    8.6      26 Jun 2019 06:40  Phos  1.8     06-26  Mg     2.0     06-26            RADIOLOGY & ADDITIONAL STUDIES:

## 2019-06-27 LAB
ANION GAP SERPL CALC-SCNC: 13 MMOL/L — SIGNIFICANT CHANGE UP (ref 5–17)
BUN SERPL-MCNC: 8 MG/DL — SIGNIFICANT CHANGE UP (ref 7–23)
CALCIUM SERPL-MCNC: 8.6 MG/DL — SIGNIFICANT CHANGE UP (ref 8.4–10.5)
CHLORIDE SERPL-SCNC: 101 MMOL/L — SIGNIFICANT CHANGE UP (ref 96–108)
CO2 SERPL-SCNC: 27 MMOL/L — SIGNIFICANT CHANGE UP (ref 22–31)
CREAT SERPL-MCNC: 0.73 MG/DL — SIGNIFICANT CHANGE UP (ref 0.5–1.3)
GLUCOSE SERPL-MCNC: 81 MG/DL — SIGNIFICANT CHANGE UP (ref 70–99)
HCT VFR BLD CALC: 32.6 % — LOW (ref 34.5–45)
HGB BLD-MCNC: 9.9 G/DL — LOW (ref 11.5–15.5)
MAGNESIUM SERPL-MCNC: 1.8 MG/DL — SIGNIFICANT CHANGE UP (ref 1.6–2.6)
MCHC RBC-ENTMCNC: 28 PG — SIGNIFICANT CHANGE UP (ref 27–34)
MCHC RBC-ENTMCNC: 30.4 GM/DL — LOW (ref 32–36)
MCV RBC AUTO: 92.4 FL — SIGNIFICANT CHANGE UP (ref 80–100)
NRBC # BLD: 0 /100 WBCS — SIGNIFICANT CHANGE UP (ref 0–0)
PHOSPHATE SERPL-MCNC: 1.5 MG/DL — LOW (ref 2.5–4.5)
PLATELET # BLD AUTO: 174 K/UL — SIGNIFICANT CHANGE UP (ref 150–400)
POTASSIUM SERPL-MCNC: 3.4 MMOL/L — LOW (ref 3.5–5.3)
POTASSIUM SERPL-SCNC: 3.4 MMOL/L — LOW (ref 3.5–5.3)
RBC # BLD: 3.53 M/UL — LOW (ref 3.8–5.2)
RBC # FLD: 12.7 % — SIGNIFICANT CHANGE UP (ref 10.3–14.5)
SODIUM SERPL-SCNC: 141 MMOL/L — SIGNIFICANT CHANGE UP (ref 135–145)
WBC # BLD: 14.86 K/UL — HIGH (ref 3.8–10.5)
WBC # FLD AUTO: 14.86 K/UL — HIGH (ref 3.8–10.5)

## 2019-06-27 RX ORDER — POTASSIUM PHOSPHATE, MONOBASIC POTASSIUM PHOSPHATE, DIBASIC 236; 224 MG/ML; MG/ML
21 INJECTION, SOLUTION INTRAVENOUS ONCE
Refills: 0 | Status: COMPLETED | OUTPATIENT
Start: 2019-06-27 | End: 2019-06-27

## 2019-06-27 RX ORDER — POTASSIUM CHLORIDE 20 MEQ
40 PACKET (EA) ORAL ONCE
Refills: 0 | Status: COMPLETED | OUTPATIENT
Start: 2019-06-27 | End: 2019-06-27

## 2019-06-27 RX ORDER — MAGNESIUM SULFATE 500 MG/ML
1 VIAL (ML) INJECTION ONCE
Refills: 0 | Status: COMPLETED | OUTPATIENT
Start: 2019-06-27 | End: 2019-06-27

## 2019-06-27 RX ORDER — POTASSIUM CHLORIDE 20 MEQ
20 PACKET (EA) ORAL ONCE
Refills: 0 | Status: COMPLETED | OUTPATIENT
Start: 2019-06-27 | End: 2019-06-27

## 2019-06-27 RX ADMIN — Medication 15 MILLIGRAM(S): at 17:10

## 2019-06-27 RX ADMIN — Medication 15 MILLIGRAM(S): at 17:23

## 2019-06-27 RX ADMIN — POTASSIUM PHOSPHATE, MONOBASIC POTASSIUM PHOSPHATE, DIBASIC 62.5 MILLIMOLE(S): 236; 224 INJECTION, SOLUTION INTRAVENOUS at 11:30

## 2019-06-27 RX ADMIN — Medication 40 MILLIEQUIVALENT(S): at 10:21

## 2019-06-27 RX ADMIN — Medication 15 MILLIGRAM(S): at 12:00

## 2019-06-27 RX ADMIN — Medication 3 MILLILITER(S): at 17:10

## 2019-06-27 RX ADMIN — Medication 15 MILLIGRAM(S): at 22:40

## 2019-06-27 RX ADMIN — Medication 15 MILLIGRAM(S): at 06:00

## 2019-06-27 RX ADMIN — ENOXAPARIN SODIUM 40 MILLIGRAM(S): 100 INJECTION SUBCUTANEOUS at 06:01

## 2019-06-27 RX ADMIN — Medication 20 MILLIEQUIVALENT(S): at 11:30

## 2019-06-27 RX ADMIN — SODIUM CHLORIDE 150 MILLILITER(S): 9 INJECTION, SOLUTION INTRAVENOUS at 17:13

## 2019-06-27 RX ADMIN — Medication 3 MILLILITER(S): at 11:30

## 2019-06-27 RX ADMIN — ENOXAPARIN SODIUM 40 MILLIGRAM(S): 100 INJECTION SUBCUTANEOUS at 17:10

## 2019-06-27 RX ADMIN — PANTOPRAZOLE SODIUM 40 MILLIGRAM(S): 20 TABLET, DELAYED RELEASE ORAL at 11:30

## 2019-06-27 RX ADMIN — SODIUM CHLORIDE 150 MILLILITER(S): 9 INJECTION, SOLUTION INTRAVENOUS at 06:03

## 2019-06-27 RX ADMIN — Medication 15 MILLIGRAM(S): at 06:30

## 2019-06-27 RX ADMIN — Medication 15 MILLIGRAM(S): at 11:30

## 2019-06-27 RX ADMIN — SCOPALAMINE 1 PATCH: 1 PATCH, EXTENDED RELEASE TRANSDERMAL at 08:19

## 2019-06-27 RX ADMIN — Medication 15 MILLIGRAM(S): at 23:15

## 2019-06-27 RX ADMIN — SCOPALAMINE 1 PATCH: 1 PATCH, EXTENDED RELEASE TRANSDERMAL at 07:09

## 2019-06-27 RX ADMIN — Medication 3 MILLILITER(S): at 06:01

## 2019-06-27 RX ADMIN — Medication 100 GRAM(S): at 10:23

## 2019-06-27 RX ADMIN — Medication 3 MILLILITER(S): at 22:40

## 2019-06-27 NOTE — PROGRESS NOTE ADULT - ASSESSMENT
65 yo F w/MO pre-diabetes, HLD, HTN, asthma, and history of sleeve gastrectomy ~ 2011, scheduled for conversion to gastric bypass s/p Robot-assisted laparoscopic Seamus-en-Y gastric bypass  revision of sleeve gastrectomy     -Pain/nausea control  -BCLD, IVF  -Protonix   -Lovenox DVT ppx   -SCDs, OOB/A, IS  -AM labs  -Nutritional consult in AM  -Like DC home today if continues to tolerate

## 2019-06-28 ENCOUNTER — TRANSCRIPTION ENCOUNTER (OUTPATIENT)
Age: 64
End: 2019-06-28

## 2019-06-28 PROBLEM — I10 ESSENTIAL (PRIMARY) HYPERTENSION: Chronic | Status: ACTIVE | Noted: 2019-06-21

## 2019-06-28 PROBLEM — E66.9 OBESITY, UNSPECIFIED: Chronic | Status: ACTIVE | Noted: 2019-06-21

## 2019-06-28 PROBLEM — G47.30 SLEEP APNEA, UNSPECIFIED: Chronic | Status: ACTIVE | Noted: 2019-06-21

## 2019-06-28 PROBLEM — E78.5 HYPERLIPIDEMIA, UNSPECIFIED: Chronic | Status: ACTIVE | Noted: 2019-06-21

## 2019-06-28 LAB
ANION GAP SERPL CALC-SCNC: 11 MMOL/L — SIGNIFICANT CHANGE UP (ref 5–17)
BUN SERPL-MCNC: 6 MG/DL — LOW (ref 7–23)
CALCIUM SERPL-MCNC: 8.4 MG/DL — SIGNIFICANT CHANGE UP (ref 8.4–10.5)
CHLORIDE SERPL-SCNC: 102 MMOL/L — SIGNIFICANT CHANGE UP (ref 96–108)
CO2 SERPL-SCNC: 24 MMOL/L — SIGNIFICANT CHANGE UP (ref 22–31)
CREAT SERPL-MCNC: 0.64 MG/DL — SIGNIFICANT CHANGE UP (ref 0.5–1.3)
D DIMER BLD IA.RAPID-MCNC: 5488 NG/ML DDU — HIGH
GLUCOSE SERPL-MCNC: 86 MG/DL — SIGNIFICANT CHANGE UP (ref 70–99)
HCT VFR BLD CALC: 29.6 % — LOW (ref 34.5–45)
HGB BLD-MCNC: 9 G/DL — LOW (ref 11.5–15.5)
MAGNESIUM SERPL-MCNC: 1.7 MG/DL — SIGNIFICANT CHANGE UP (ref 1.6–2.6)
MCHC RBC-ENTMCNC: 28.4 PG — SIGNIFICANT CHANGE UP (ref 27–34)
MCHC RBC-ENTMCNC: 30.4 GM/DL — LOW (ref 32–36)
MCV RBC AUTO: 93.4 FL — SIGNIFICANT CHANGE UP (ref 80–100)
NRBC # BLD: 0 /100 WBCS — SIGNIFICANT CHANGE UP (ref 0–0)
PHOSPHATE SERPL-MCNC: 2.6 MG/DL — SIGNIFICANT CHANGE UP (ref 2.5–4.5)
PLATELET # BLD AUTO: 165 K/UL — SIGNIFICANT CHANGE UP (ref 150–400)
POTASSIUM SERPL-MCNC: 3.8 MMOL/L — SIGNIFICANT CHANGE UP (ref 3.5–5.3)
POTASSIUM SERPL-SCNC: 3.8 MMOL/L — SIGNIFICANT CHANGE UP (ref 3.5–5.3)
RBC # BLD: 3.17 M/UL — LOW (ref 3.8–5.2)
RBC # FLD: 12.5 % — SIGNIFICANT CHANGE UP (ref 10.3–14.5)
SODIUM SERPL-SCNC: 137 MMOL/L — SIGNIFICANT CHANGE UP (ref 135–145)
SURGICAL PATHOLOGY STUDY: SIGNIFICANT CHANGE UP
WBC # BLD: 11.29 K/UL — HIGH (ref 3.8–10.5)
WBC # FLD AUTO: 11.29 K/UL — HIGH (ref 3.8–10.5)

## 2019-06-28 PROCEDURE — 93970 EXTREMITY STUDY: CPT | Mod: 26

## 2019-06-28 PROCEDURE — 71275 CT ANGIOGRAPHY CHEST: CPT | Mod: 26

## 2019-06-28 PROCEDURE — 71046 X-RAY EXAM CHEST 2 VIEWS: CPT | Mod: 26

## 2019-06-28 RX ORDER — LOSARTAN/HYDROCHLOROTHIAZIDE 100MG-25MG
1 TABLET ORAL
Qty: 0 | Refills: 0 | DISCHARGE

## 2019-06-28 RX ORDER — OXYCODONE AND ACETAMINOPHEN 5; 325 MG/1; MG/1
1 TABLET ORAL EVERY 6 HOURS
Refills: 0 | Status: DISCONTINUED | OUTPATIENT
Start: 2019-06-28 | End: 2019-06-29

## 2019-06-28 RX ORDER — POTASSIUM CHLORIDE 20 MEQ
20 PACKET (EA) ORAL ONCE
Refills: 0 | Status: COMPLETED | OUTPATIENT
Start: 2019-06-28 | End: 2019-06-28

## 2019-06-28 RX ORDER — LANOLIN ALCOHOL/MO/W.PET/CERES
3 CREAM (GRAM) TOPICAL ONCE
Refills: 0 | Status: COMPLETED | OUTPATIENT
Start: 2019-06-28 | End: 2019-06-28

## 2019-06-28 RX ADMIN — Medication 15 MILLIGRAM(S): at 07:09

## 2019-06-28 RX ADMIN — HYDROMORPHONE HYDROCHLORIDE 0.5 MILLIGRAM(S): 2 INJECTION INTRAMUSCULAR; INTRAVENOUS; SUBCUTANEOUS at 03:30

## 2019-06-28 RX ADMIN — HYDROMORPHONE HYDROCHLORIDE 0.5 MILLIGRAM(S): 2 INJECTION INTRAMUSCULAR; INTRAVENOUS; SUBCUTANEOUS at 02:58

## 2019-06-28 RX ADMIN — OXYCODONE AND ACETAMINOPHEN 1 TABLET(S): 5; 325 TABLET ORAL at 17:32

## 2019-06-28 RX ADMIN — Medication 3 MILLILITER(S): at 12:55

## 2019-06-28 RX ADMIN — Medication 3 MILLILITER(S): at 17:23

## 2019-06-28 RX ADMIN — OXYCODONE AND ACETAMINOPHEN 1 TABLET(S): 5; 325 TABLET ORAL at 10:38

## 2019-06-28 RX ADMIN — SODIUM CHLORIDE 150 MILLILITER(S): 9 INJECTION, SOLUTION INTRAVENOUS at 06:30

## 2019-06-28 RX ADMIN — PANTOPRAZOLE SODIUM 40 MILLIGRAM(S): 20 TABLET, DELAYED RELEASE ORAL at 12:55

## 2019-06-28 RX ADMIN — OXYCODONE AND ACETAMINOPHEN 1 TABLET(S): 5; 325 TABLET ORAL at 18:25

## 2019-06-28 RX ADMIN — Medication 15 MILLIGRAM(S): at 06:32

## 2019-06-28 RX ADMIN — Medication 20 MILLIEQUIVALENT(S): at 17:24

## 2019-06-28 RX ADMIN — ENOXAPARIN SODIUM 40 MILLIGRAM(S): 100 INJECTION SUBCUTANEOUS at 17:24

## 2019-06-28 RX ADMIN — OXYCODONE AND ACETAMINOPHEN 1 TABLET(S): 5; 325 TABLET ORAL at 09:58

## 2019-06-28 RX ADMIN — Medication 3 MILLIGRAM(S): at 02:43

## 2019-06-28 RX ADMIN — ENOXAPARIN SODIUM 40 MILLIGRAM(S): 100 INJECTION SUBCUTANEOUS at 06:33

## 2019-06-28 RX ADMIN — OXYCODONE AND ACETAMINOPHEN 1 TABLET(S): 5; 325 TABLET ORAL at 22:41

## 2019-06-28 RX ADMIN — Medication 3 MILLILITER(S): at 22:38

## 2019-06-28 NOTE — DISCHARGE NOTE PROVIDER - CARE PROVIDERS DIRECT ADDRESSES
[email or web address]

## 2019-06-28 NOTE — DISCHARGE NOTE PROVIDER - HOSPITAL COURSE
64 F with morbid obesity - BMI 46, and associated pre-diabetes, HLD, HTN, asthma, and history of sleeve gastrectomy in 2011 s/p Robot-assisted laparoscopic Seamus-en-Y gastric bypass  revision of sleeve gastrectomy (6/24). Procedure was uncomplicated an pt tolerated well. On POD1 pt had UGI demonstrating no leak,  and pt was started on bariatric clear liquid diet and tolerating slowly. In the following days, the pt continued to tolerate more by mouth and ambulate. On POD4 pt was saturating between 92-94 on room air and asymptomatic, CXR ordered and within normal limits, LE duplex negative and D-dimer negative. On day of discharge pt was afebrile, VSS, tolerating bcld, ambulating, pain well controlled and stable for discharge home. 64 F with morbid obesity - BMI 46, and associated pre-diabetes, HLD, HTN, asthma, and history of sleeve gastrectomy in 2011 s/p Robot-assisted laparoscopic Seamus-en-Y gastric bypass  revision of sleeve gastrectomy (6/24). Procedure was uncomplicated an pt tolerated well. On POD1 pt had UGI demonstrating no leak,  and pt was started on bariatric clear liquid diet and tolerating slowly. In the following days, the pt continued to tolerate more by mouth and ambulate. On POD4 pt was saturating between 92-94 on room air and asymptomatic, CXR ordered and within normal limits, LE duplex negative, D-dimer sent and elevated. CT angio was completed and negative for PE. On day of discharge pt was afebrile, VSS, tolerating bcld, ambulating, pain well controlled and stable for discharge home on Lovenox. 64 F with morbid obesity - BMI 46, and associated pre-diabetes, HLD, HTN, asthma, and history of sleeve gastrectomy in 2011 s/p Robot-assisted laparoscopic Seamus-en-Y gastric bypass  revision of sleeve gastrectomy (6/24). Procedure was uncomplicated an pt tolerated well. On POD1 pt had UGI demonstrating no leak,  and pt was started on bariatric clear liquid diet and tolerating slowly. In the following days, the pt continued to tolerate more by mouth and ambulate. On POD4 pt was saturating between 92-94 on room air and asymptomatic, CXR ordered and within normal limits, LE duplex negative, D-dimer sent and elevated. CT angio was completed and negative for PE. On day of discharge pt was afebrile, VSS, tolerating bcld, ambulating, Lovenox teaching completed and pt comfortable for self-injection, pain well controlled and stable for discharge home on Lovenox.

## 2019-06-28 NOTE — DISCHARGE NOTE PROVIDER - INSTRUCTIONS
Diet: Bariatric Clear Fluids. 60 grams protein daily.  64 fluid ounces water daily. Drink small sips throughout the day. Continue diet as outlined by paperwork received as a pre-operative patient.

## 2019-06-28 NOTE — PROGRESS NOTE ADULT - ASSESSMENT
64 F with morbid obesity - BMI 46, and associated pre-diabetes, HLD, HTN, asthma, and history of sleeve gastrectomy ~ 2011, scheduled for conversion to gastric bypass. Patient s/p Robot-assisted laparoscopic Seamus-en-Y gastric bypass  revision of sleeve gastrectomy     -Pain/nausea control  -BCLD, IVF  -CXR  -LE duplex, D-dimer  -Protonix   -Lovenox DVT ppx   -SCDs, OOB/A, IS  -AM labs  -Nutritional consult in AM  -Possible discharge today pending LE duplex and D-dimer

## 2019-06-28 NOTE — CHART NOTE - NSCHARTNOTEFT_GEN_A_CORE
Admitting Diagnosis:   Patient is a 64y old  Female who presents with a chief complaint of revisional bariatric surgery (28 Jun 2019 10:15)      PAST MEDICAL & SURGICAL HISTORY:  Sleep apnea  Hyperlipidemia  Obesity  Hypertension  H/O hernia repair  History of sleeve gastrectomy      Current Nutrition Order:   BARICLLIQ    PO Intake: Good (%) [x  ]  Fair (50-75%) [   ] Poor (<25%) [   ]    GI Issues:   No apparent GI distress, no N/V    Pain:  Pain being medically managed    Skin Integrity:  Intact    Labs:   06-28    137  |  102  |  6<L>  ----------------------------<  86  3.8   |  24  |  0.64    Ca    8.4      28 Jun 2019 05:49  Phos  2.6     06-28  Mg     1.7     06-28      CAPILLARY BLOOD GLUCOSE          Medications:  MEDICATIONS  (STANDING):  ALBUTerol    90 MICROgram(s) HFA Inhaler 1 Puff(s) Inhalation every 4 hours  ALBUTerol/ipratropium for Nebulization 3 milliLiter(s) Nebulizer every 6 hours  enoxaparin Injectable 40 milliGRAM(s) SubCutaneous every 12 hours  pantoprazole  Injectable 40 milliGRAM(s) IV Push daily  potassium chloride   Powder 20 milliEquivalent(s) Oral once    MEDICATIONS  (PRN):  ondansetron Injectable 4 milliGRAM(s) IV Push every 6 hours PRN Nausea  oxyCODONE    5 mG/acetaminophen 325 mG 1 Tablet(s) Oral every 6 hours PRN Severe Pain (7 - 10)      Weight: 109.3 kg  Height: 5'1", IBW 48 kg+/-10%, %%, BMI 45.5  Daily     Weight Change:   No known wt changes PTA    Estimated energy needs:   IBW used for calculations as pt >120% of IBW   Nutrient needs based on [facility] standards of care for s/p bariatric surgery  Weeks 1-2 estimated needs: 498-598kcal/day (10-12kcal/kg), 57-75g pro/day (1.2-1.5g/kg), >/=64oz clear fluids.    Subjective:   63yo F w/ history of sleeve gastrectomy (2011) s/p conversion to gastric bypass POD4. Pt is for possible d/c today pending LE duplex and d-dimer. Pt out of room for US at time of assessment. Remains on BARICLLIQ and tolerating PO. Per RN, pt has had consistently good intake, consuming >4oz/hr. No apparent GI distress, nausea and pain are well controlled. Recommend advancing diet to Phase 1 Full Liquids as tolerated. RD to attempt to follow up for diet reinforcement.     Previous Nutrition Diagnosis:  Obesity RT RT predicted hypercaloric intake prior to admission and unsatisfactory results with previous wt loss attempts AEB BMI 45of and 227% IBW   Active [ x  ]  Resolved [  ]    If resolved, new PES:     Goal:  Pt to recall 2 main concepts from education (protein needs, fluid needs, vitamin and mineral needs)     Recommendations:  1) Advance diet to Phase 1 Bariatric Full Liquids once medically feasible  2) Monitor tolerance as diet advances  3) Encourage 4oz fluids Q1H     Education:   Edu provided at initial. RD to f/u for reinforcement    Risk Level: High [ x  ] Moderate [   ] Low [   ]

## 2019-06-28 NOTE — PROGRESS NOTE ADULT - SUBJECTIVE AND OBJECTIVE BOX
SUBJECTIVE: Pt seen and examined at bedside. Pain is well controlled. Denies chest pain, SOB, sating 91-92 on Room air Tolerating cld,denies nausea, emesis. Making appropriate UO.      enoxaparin Injectable 40 milliGRAM(s) SubCutaneous every 12 hours      Vital Signs Last 24 Hrs  T(C): 36.7 (28 Jun 2019 08:55), Max: 37.6 (27 Jun 2019 14:05)  T(F): 98.1 (28 Jun 2019 08:55), Max: 99.6 (27 Jun 2019 14:05)  HR: 92 (28 Jun 2019 10:47) (79 - 105)  BP: 139/90 (28 Jun 2019 08:55) (126/87 - 139/90)  BP(mean): --  RR: 18 (28 Jun 2019 10:47) (17 - 24)  SpO2: 98% (28 Jun 2019 10:47) (94% - 98%)    Gen: NAD, resting comfortably in bed  Pulm: CTAB, Good inspiratory effort, nonlabored breathing  C/V: NSR  Abd: Soft, appropriately tender, nondistended. No rebound, no guarding. Incisions C/D/I  Extrem: WWP, no edema        LABS:                        9.0    11.29 )-----------( 165      ( 28 Jun 2019 05:49 )             29.6     06-28    137  |  102  |  6<L>  ----------------------------<  86  3.8   |  24  |  0.64    Ca    8.4      28 Jun 2019 05:49  Phos  2.6     06-28  Mg     1.7     06-28

## 2019-06-28 NOTE — DISCHARGE NOTE PROVIDER - CARE PROVIDER_API CALL
Dawit Sanders)  Surgery  [address] [place] [address]  Phone: [phone]  Fax: [phone]  Follow Up Time:

## 2019-06-29 ENCOUNTER — TRANSCRIPTION ENCOUNTER (OUTPATIENT)
Age: 64
End: 2019-06-29

## 2019-06-29 VITALS
SYSTOLIC BLOOD PRESSURE: 129 MMHG | HEART RATE: 98 BPM | OXYGEN SATURATION: 95 % | TEMPERATURE: 99 F | DIASTOLIC BLOOD PRESSURE: 87 MMHG | RESPIRATION RATE: 18 BRPM

## 2019-06-29 LAB
ANION GAP SERPL CALC-SCNC: 15 MMOL/L — SIGNIFICANT CHANGE UP (ref 5–17)
BUN SERPL-MCNC: 5 MG/DL — LOW (ref 7–23)
CALCIUM SERPL-MCNC: 9 MG/DL — SIGNIFICANT CHANGE UP (ref 8.4–10.5)
CHLORIDE SERPL-SCNC: 98 MMOL/L — SIGNIFICANT CHANGE UP (ref 96–108)
CO2 SERPL-SCNC: 26 MMOL/L — SIGNIFICANT CHANGE UP (ref 22–31)
CREAT SERPL-MCNC: 0.57 MG/DL — SIGNIFICANT CHANGE UP (ref 0.5–1.3)
GLUCOSE SERPL-MCNC: 74 MG/DL — SIGNIFICANT CHANGE UP (ref 70–99)
HCT VFR BLD CALC: 31.9 % — LOW (ref 34.5–45)
HGB BLD-MCNC: 9.8 G/DL — LOW (ref 11.5–15.5)
MAGNESIUM SERPL-MCNC: 1.5 MG/DL — LOW (ref 1.6–2.6)
MCHC RBC-ENTMCNC: 28.2 PG — SIGNIFICANT CHANGE UP (ref 27–34)
MCHC RBC-ENTMCNC: 30.7 GM/DL — LOW (ref 32–36)
MCV RBC AUTO: 91.9 FL — SIGNIFICANT CHANGE UP (ref 80–100)
NRBC # BLD: 0 /100 WBCS — SIGNIFICANT CHANGE UP (ref 0–0)
PHOSPHATE SERPL-MCNC: 2.6 MG/DL — SIGNIFICANT CHANGE UP (ref 2.5–4.5)
PLATELET # BLD AUTO: 184 K/UL — SIGNIFICANT CHANGE UP (ref 150–400)
POTASSIUM SERPL-MCNC: 3.6 MMOL/L — SIGNIFICANT CHANGE UP (ref 3.5–5.3)
POTASSIUM SERPL-SCNC: 3.6 MMOL/L — SIGNIFICANT CHANGE UP (ref 3.5–5.3)
RBC # BLD: 3.47 M/UL — LOW (ref 3.8–5.2)
RBC # FLD: 12.4 % — SIGNIFICANT CHANGE UP (ref 10.3–14.5)
SODIUM SERPL-SCNC: 139 MMOL/L — SIGNIFICANT CHANGE UP (ref 135–145)
WBC # BLD: 10.36 K/UL — SIGNIFICANT CHANGE UP (ref 3.8–10.5)
WBC # FLD AUTO: 10.36 K/UL — SIGNIFICANT CHANGE UP (ref 3.8–10.5)

## 2019-06-29 RX ORDER — LANOLIN ALCOHOL/MO/W.PET/CERES
3 CREAM (GRAM) TOPICAL ONCE
Refills: 0 | Status: COMPLETED | OUTPATIENT
Start: 2019-06-29 | End: 2019-06-29

## 2019-06-29 RX ORDER — ENOXAPARIN SODIUM 100 MG/ML
40 INJECTION SUBCUTANEOUS
Qty: 560 | Refills: 0
Start: 2019-06-29 | End: 2019-07-12

## 2019-06-29 RX ORDER — PANTOPRAZOLE SODIUM 20 MG/1
1 TABLET, DELAYED RELEASE ORAL
Qty: 30 | Refills: 0
Start: 2019-06-29

## 2019-06-29 RX ADMIN — PANTOPRAZOLE SODIUM 40 MILLIGRAM(S): 20 TABLET, DELAYED RELEASE ORAL at 10:06

## 2019-06-29 RX ADMIN — OXYCODONE AND ACETAMINOPHEN 1 TABLET(S): 5; 325 TABLET ORAL at 13:15

## 2019-06-29 RX ADMIN — OXYCODONE AND ACETAMINOPHEN 1 TABLET(S): 5; 325 TABLET ORAL at 17:55

## 2019-06-29 RX ADMIN — OXYCODONE AND ACETAMINOPHEN 1 TABLET(S): 5; 325 TABLET ORAL at 18:35

## 2019-06-29 RX ADMIN — Medication 3 MILLILITER(S): at 07:07

## 2019-06-29 RX ADMIN — ENOXAPARIN SODIUM 40 MILLIGRAM(S): 100 INJECTION SUBCUTANEOUS at 17:54

## 2019-06-29 RX ADMIN — Medication 3 MILLIGRAM(S): at 00:47

## 2019-06-29 RX ADMIN — Medication 3 MILLILITER(S): at 14:23

## 2019-06-29 RX ADMIN — ENOXAPARIN SODIUM 40 MILLIGRAM(S): 100 INJECTION SUBCUTANEOUS at 07:06

## 2019-06-29 RX ADMIN — Medication 3 MILLILITER(S): at 17:54

## 2019-06-29 RX ADMIN — OXYCODONE AND ACETAMINOPHEN 1 TABLET(S): 5; 325 TABLET ORAL at 14:00

## 2019-06-29 RX ADMIN — OXYCODONE AND ACETAMINOPHEN 1 TABLET(S): 5; 325 TABLET ORAL at 00:43

## 2019-06-29 NOTE — PROGRESS NOTE ADULT - SUBJECTIVE AND OBJECTIVE BOX
SUBJECTIVE: Pt seen and examined at bedside. Pain well controlled. Tolerating bcld, denies nausea, emesis.  Ambulating, making appropriate UO.      enoxaparin Injectable 40 milliGRAM(s) SubCutaneous every 12 hours      Vital Signs Last 24 Hrs  T(C): 36.7 (29 Jun 2019 09:46), Max: 37.3 (29 Jun 2019 01:06)  T(F): 98 (29 Jun 2019 09:46), Max: 99.1 (29 Jun 2019 01:06)  HR: 82 (29 Jun 2019 09:46) (82 - 98)  BP: 144/92 (29 Jun 2019 09:46) (122/87 - 147/94)  BP(mean): --  RR: 16 (29 Jun 2019 09:46) (16 - 20)  SpO2: 96% (29 Jun 2019 09:46) (94% - 100%)    Gen: NAD, resting comfortably in bed  Pulm: CTAB, Good inspiratory effort, nonlabored breathing  C/V: NSR  Abd: Soft, appropriately tender, nondistended. No rebound, no guarding. Incisions C/D/I  Extrem: WWP, no edema        LABS:                        9.8    10.36 )-----------( 184      ( 29 Jun 2019 09:05 )             31.9     06-29    139  |  98  |  5<L>  ----------------------------<  74  3.6   |  26  |  0.57    Ca    9.0      29 Jun 2019 09:05  Phos  2.6     06-29  Mg     1.5     06-29

## 2019-06-29 NOTE — PROGRESS NOTE ADULT - ASSESSMENT
64 F with morbid obesity - BMI 46, and associated pre-diabetes, HLD, HTN, asthma, and history of sleeve gastrectomy ~ 2011, scheduled for conversion to gastric bypass. Patient s/p Robot-assisted laparoscopic Seamus-en-Y gastric bypass  revision of sleeve gastrectomy     -Pain/nausea control  -BCLD, IVF  -Protonix   -Lovenox DVT ppx   -SCDs, OOB/A, IS  -AM labs  -Nutritional consult  -Discharge home today with Lovenox

## 2019-06-29 NOTE — DISCHARGE NOTE NURSING/CASE MANAGEMENT/SOCIAL WORK - NSDCDPATPORTLINK_GEN_ALL_CORE
You can access the [facility] Portal, offered by [facility], by registering with the following website: [email or web address]

## 2019-07-05 ENCOUNTER — APPOINTMENT (OUTPATIENT)
Age: 64
End: 2019-07-05
Payer: MEDICAID

## 2019-07-05 VITALS
DIASTOLIC BLOOD PRESSURE: 82 MMHG | BODY MASS INDEX: 44.56 KG/M2 | SYSTOLIC BLOOD PRESSURE: 117 MMHG | TEMPERATURE: 97.6 F | WEIGHT: 236 LBS | HEART RATE: 96 BPM | OXYGEN SATURATION: 96 % | HEIGHT: 61 IN

## 2019-07-05 PROCEDURE — 99024 POSTOP FOLLOW-UP VISIT: CPT

## 2019-07-05 NOTE — HISTORY OF PRESENT ILLNESS
[de-identified] : 63 y/o F 1 week s/p conversion of sleeve to bypass presents here today for post-op visit. She presents here together with her daughter. She reports here feeling well. Her daughter states that she is tolerating her diet, taking her protein shakes 2x/ day. She is also having Jello and soup. Daughter reports she is taking adequate amounts of water. She states no complications with her incisions. She reports regular bowel movements. Denies any pain, fever, chills or nausea. She has also lost 10lbs since her surgery. Her daughter does state that she experienced episodes of SOB, especially at night but has now improved since the past 2 days. She attributes this to not taking her HTN medications, where it has improved after she took her medications. She is currently walking around her house for exercise. She also reports compliance with her vitamins.\par \par \par Informed pt to clean incisions with soapy water gently and pat dry. Avoid scrubbing

## 2019-07-05 NOTE — ADDENDUM
[FreeTextEntry1] : Documented by Geno Quiroga acting as a scribe for Dr. Dawit Sanders on 07/05/2019\par

## 2019-07-05 NOTE — PLAN
[FreeTextEntry1] : See nutritionist\par Follow up with PCP for HTN management\par FU here in 3 weeks.

## 2019-07-05 NOTE — END OF VISIT
[FreeTextEntry3] : All medical record entries made by the Scribe were at my, Dr. Sanders's direction and personally dictated by me on 07/05/2019  I have reviewed the chart and agree that the record accurately reflects my personal performance of the history, physical exam, assessment and plan. I have also personally directed, reviewed, and agreed with the chart.\par \par

## 2019-07-05 NOTE — ASSESSMENT
[FreeTextEntry1] : 65 y/o F 1 week s/p conversion of sleeve to bypass presents here today for post-op visit. Patient is doing well. She is advised to continue her diet, consuming enough protein and water. She is to continue walking for exercise but to avoid any heavy lifting. Will continue to comply with her vitamins. Informed her that her SOB is likely due to undernourishment. Will see nutritionist for nutritional counseling. Informed patient to follow up with her PCP to manage her HTN medications and advised patient against taking medications unless directed to do so. Additionally, she is encouraged to check her blood pressure 3 times daily. Will follow up here in 3 weeks for her next routine visit.

## 2019-07-09 DIAGNOSIS — J45.909 UNSPECIFIED ASTHMA, UNCOMPLICATED: ICD-10-CM

## 2019-07-09 DIAGNOSIS — E66.9 OBESITY, UNSPECIFIED: ICD-10-CM

## 2019-07-09 DIAGNOSIS — R73.03 PREDIABETES: ICD-10-CM

## 2019-07-09 DIAGNOSIS — K43.0 INCISIONAL HERNIA WITH OBSTRUCTION, WITHOUT GANGRENE: ICD-10-CM

## 2019-07-09 DIAGNOSIS — E78.5 HYPERLIPIDEMIA, UNSPECIFIED: ICD-10-CM

## 2019-07-09 DIAGNOSIS — I10 ESSENTIAL (PRIMARY) HYPERTENSION: ICD-10-CM

## 2019-07-09 DIAGNOSIS — K21.9 GASTRO-ESOPHAGEAL REFLUX DISEASE WITHOUT ESOPHAGITIS: ICD-10-CM

## 2019-07-09 DIAGNOSIS — E66.01 MORBID (SEVERE) OBESITY DUE TO EXCESS CALORIES: ICD-10-CM

## 2019-07-09 DIAGNOSIS — G47.30 SLEEP APNEA, UNSPECIFIED: ICD-10-CM

## 2019-07-09 DIAGNOSIS — G47.33 OBSTRUCTIVE SLEEP APNEA (ADULT) (PEDIATRIC): ICD-10-CM

## 2019-07-10 PROCEDURE — 94640 AIRWAY INHALATION TREATMENT: CPT

## 2019-07-10 PROCEDURE — 88307 TISSUE EXAM BY PATHOLOGIST: CPT

## 2019-07-10 PROCEDURE — 85027 COMPLETE CBC AUTOMATED: CPT

## 2019-07-10 PROCEDURE — 36415 COLL VENOUS BLD VENIPUNCTURE: CPT

## 2019-07-10 PROCEDURE — 88302 TISSUE EXAM BY PATHOLOGIST: CPT

## 2019-07-10 PROCEDURE — S2900: CPT

## 2019-07-10 PROCEDURE — 71275 CT ANGIOGRAPHY CHEST: CPT

## 2019-07-10 PROCEDURE — 71046 X-RAY EXAM CHEST 2 VIEWS: CPT

## 2019-07-10 PROCEDURE — 93970 EXTREMITY STUDY: CPT

## 2019-07-10 PROCEDURE — 85379 FIBRIN DEGRADATION QUANT: CPT

## 2019-07-10 PROCEDURE — 80048 BASIC METABOLIC PNL TOTAL CA: CPT

## 2019-07-10 PROCEDURE — 86850 RBC ANTIBODY SCREEN: CPT

## 2019-07-10 PROCEDURE — 84100 ASSAY OF PHOSPHORUS: CPT

## 2019-07-10 PROCEDURE — 86901 BLOOD TYPING SEROLOGIC RH(D): CPT

## 2019-07-10 PROCEDURE — 86803 HEPATITIS C AB TEST: CPT

## 2019-07-10 PROCEDURE — 94660 CPAP INITIATION&MGMT: CPT

## 2019-07-10 PROCEDURE — 85025 COMPLETE CBC W/AUTO DIFF WBC: CPT

## 2019-07-10 PROCEDURE — 74241: CPT

## 2019-07-10 PROCEDURE — 86900 BLOOD TYPING SEROLOGIC ABO: CPT

## 2019-07-10 PROCEDURE — 83735 ASSAY OF MAGNESIUM: CPT

## 2019-07-30 ENCOUNTER — APPOINTMENT (OUTPATIENT)
Dept: SURGERY | Facility: CLINIC | Age: 64
End: 2019-07-30
Payer: MEDICAID

## 2019-07-30 VITALS
WEIGHT: 226.38 LBS | HEIGHT: 61 IN | OXYGEN SATURATION: 97 % | SYSTOLIC BLOOD PRESSURE: 108 MMHG | BODY MASS INDEX: 42.74 KG/M2 | HEART RATE: 86 BPM | DIASTOLIC BLOOD PRESSURE: 78 MMHG | TEMPERATURE: 97.2 F

## 2019-07-30 PROCEDURE — 99024 POSTOP FOLLOW-UP VISIT: CPT

## 2019-07-30 NOTE — HISTORY OF PRESENT ILLNESS
[de-identified] : Pt is a 63 y/o F 1 mo s/p conversion of sleeve to bypass, presents today feeling well. States things are going well at home, tolerating her diet. Reports some constipation, states she is not having 8 cups daily. 2 protein shakes daily, tolerating with no problems. Reports a 20 lb wt loss since surgery. Pt reports walking at home for exercise, pt told she is cleared to begin any cardio exercises as she can tolerate.

## 2019-08-07 NOTE — PATIENT PROFILE ADULT - HAVE YOU EXPERIENCED VIOLENCE OR A TRAUMATIC EVENT?
[FreeTextEntry1] : Recovering well.\par May liberalize activity six weeks from surgery.\par F/u prn. no

## 2019-09-17 ENCOUNTER — APPOINTMENT (OUTPATIENT)
Dept: SURGERY | Facility: CLINIC | Age: 64
End: 2019-09-17
Payer: MEDICAID

## 2019-09-17 VITALS
OXYGEN SATURATION: 97 % | HEIGHT: 61 IN | TEMPERATURE: 97.9 F | SYSTOLIC BLOOD PRESSURE: 134 MMHG | BODY MASS INDEX: 40.59 KG/M2 | HEART RATE: 64 BPM | WEIGHT: 215 LBS | DIASTOLIC BLOOD PRESSURE: 79 MMHG

## 2019-09-17 PROCEDURE — 99024 POSTOP FOLLOW-UP VISIT: CPT

## 2019-09-17 RX ORDER — CALCIUM CITRATE/VITAMIN D3 200MG-6.25
200-250 TABLET ORAL DAILY
Qty: 60 | Refills: 6 | Status: ACTIVE | COMMUNITY
Start: 2019-09-17 | End: 1900-01-01

## 2019-09-17 RX ORDER — IRON PS COMPLEX/B12/FOLIC ACID 150-25-1
150-25-1 CAPSULE ORAL
Qty: 30 | Refills: 6 | Status: ACTIVE | COMMUNITY
Start: 2019-09-17 | End: 1900-01-01

## 2019-09-17 NOTE — HISTORY OF PRESENT ILLNESS
[de-identified] : 63 y/o F approximately 3 months s/p gastric bypass (6/24/19) presents here today for follow up. She reports here feeling well and better since her last visit. She states that she has lost about 31lbs since her surgery. She states that she has not been exercising much, reports being noncompliant with physical activity. Explained to pt the importance of exercise for weight loss along with diet. Pt taking 2 protein shakes, reports that she is taking her vitamins at home daily. Leopoldo ge,renzo,c,d.

## 2019-11-19 ENCOUNTER — APPOINTMENT (OUTPATIENT)
Dept: SURGERY | Facility: CLINIC | Age: 64
End: 2019-11-19
Payer: MEDICAID

## 2019-11-19 VITALS
HEIGHT: 61 IN | HEART RATE: 66 BPM | BODY MASS INDEX: 38.16 KG/M2 | DIASTOLIC BLOOD PRESSURE: 88 MMHG | TEMPERATURE: 97.2 F | SYSTOLIC BLOOD PRESSURE: 131 MMHG | WEIGHT: 202.13 LBS | OXYGEN SATURATION: 96 %

## 2019-11-19 PROCEDURE — 99213 OFFICE O/P EST LOW 20 MIN: CPT

## 2019-11-19 NOTE — HISTORY OF PRESENT ILLNESS
[de-identified] : 65 y/o F with hx of VSG s/p conversion to bypass on 6/24/19, presents here today for follow up. She reports here feeling poorly, complaining that she feels weak. She is tolerating her diet but reports skipping meals. Also reports eating fruit without protein. She has also been taking protein shakes daily. She has lost about ~44lbs since her surgery. She is compliant with her vitamins. She has not been exercising however, but reports daily walking.

## 2019-11-19 NOTE — ASSESSMENT
[FreeTextEntry1] : 63 y/o F with hx of VSG s/p conversion to bypass on 6/24/19. Patient is doing well. She is reminded on the importance of complying with instructions and instructed to avoid skipping meals. She is to continue with the recommended diet and advised to begin an exercise regimen with aerobic and strength training exercises. Will see the nutritionist. Will also go for blood work. To follow up here in 3 months.

## 2019-11-19 NOTE — END OF VISIT
[FreeTextEntry3] : All medical record entries made by the Scribe were at my, Dr. Sanders's direction and personally dictated by me on 11/19/2019  I have reviewed the chart and agree that the record accurately reflects my personal performance of the history, physical exam, assessment and plan. I have also personally directed, reviewed, and agreed with the chart.\par \par

## 2019-11-19 NOTE — ADDENDUM
[FreeTextEntry1] : Documented by Geno Quiroga acting as a scribe for Dr. Dawit Sanders on 11/19/2019\par

## 2019-12-16 ENCOUNTER — OTHER (OUTPATIENT)
Age: 64
End: 2019-12-16

## 2020-01-14 ENCOUNTER — APPOINTMENT (OUTPATIENT)
Dept: SURGERY | Facility: CLINIC | Age: 65
End: 2020-01-14
Payer: MEDICAID

## 2020-01-14 VITALS
SYSTOLIC BLOOD PRESSURE: 137 MMHG | HEIGHT: 61 IN | TEMPERATURE: 97.6 F | HEART RATE: 71 BPM | DIASTOLIC BLOOD PRESSURE: 94 MMHG | WEIGHT: 195 LBS | OXYGEN SATURATION: 97 % | BODY MASS INDEX: 36.82 KG/M2

## 2020-01-14 DIAGNOSIS — E66.01 MORBID (SEVERE) OBESITY DUE TO EXCESS CALORIES: ICD-10-CM

## 2020-01-14 DIAGNOSIS — I10 ESSENTIAL (PRIMARY) HYPERTENSION: ICD-10-CM

## 2020-01-14 DIAGNOSIS — E78.00 PURE HYPERCHOLESTEROLEMIA, UNSPECIFIED: ICD-10-CM

## 2020-01-14 PROCEDURE — 99213 OFFICE O/P EST LOW 20 MIN: CPT

## 2020-01-14 NOTE — ASSESSMENT
[FreeTextEntry1] : 63 y/o F with hx of VSG s/p conversion to bypass on 6/24/19 here for follow up. Patient is doing well. We reviewed patient's most recent blood work which was relatively normal. Today's vital sign shows patient to have slightly elevated blood pressure. I advised patient to speak to her PCP about managing her HTN. Patient endorses fatigue and dizziness. She is advised to drink plenty of water.  She is also advised to remain on the recommended bariatric diet and to continue her exercise routine as well as complying with her vitamins. To follow up in 3 months.

## 2020-01-14 NOTE — HISTORY OF PRESENT ILLNESS
[de-identified] : 63 y/o F with hx of VSG s/p conversion to bypass on 6/24/19, presents here today for follow up. She is doing well but continues to endorse dizziness and fatigue. She has been compliant with vitamins. She is tolerating her diet but reports that it could be better.\par

## 2020-01-14 NOTE — END OF VISIT
[FreeTextEntry3] : All medical record entries made by the Scribe were at my, Dr. Sanders's, discretion and personally dictated by me on 01/14/2020. I have reviewed the chart and agree that the record accurately reflects my personal performance of the history, physical exam, assessment and plan. I have also personally directed, reviewed and agreed to the chart.

## 2020-01-14 NOTE — ADDENDUM
[FreeTextEntry1] : This note was written by Roseanne Durand on 01/14/2020  acting as scribe for Dr. Sanders

## 2020-02-18 LAB
25(OH)D3 SERPL-MCNC: 46 NG/ML
A-TOCOPHEROL VIT E SERPL-MCNC: 14.1 MG/L
ALBUMIN SERPL ELPH-MCNC: 4.3 G/DL
ALP BLD-CCNC: 64 U/L
ALT SERPL-CCNC: 15 U/L
ANION GAP SERPL CALC-SCNC: 13 MMOL/L
APPEARANCE: CLEAR
AST SERPL-CCNC: 19 U/L
BACTERIA: NEGATIVE
BASOPHILS # BLD AUTO: 0.06 K/UL
BASOPHILS NFR BLD AUTO: 1 %
BETA+GAMMA TOCOPHEROL SERPL-MCNC: 0.3 MG/L
BILIRUB SERPL-MCNC: 0.8 MG/DL
BILIRUBIN URINE: NEGATIVE
BLOOD URINE: NEGATIVE
BUN SERPL-MCNC: 9 MG/DL
CA-I SERPL-SCNC: 1.24 MMOL/L
CALCIUM SERPL-MCNC: 10.2 MG/DL
CALCIUM SERPL-MCNC: 10.2 MG/DL
CHLORIDE SERPL-SCNC: 97 MMOL/L
CHOLEST SERPL-MCNC: 177 MG/DL
CHOLEST/HDLC SERPL: 3 RATIO
CO2 SERPL-SCNC: 32 MMOL/L
COLOR: YELLOW
CREAT SERPL-MCNC: 0.54 MG/DL
EOSINOPHIL # BLD AUTO: 0.14 K/UL
EOSINOPHIL NFR BLD AUTO: 2.3 %
ESTIMATED AVERAGE GLUCOSE: 108 MG/DL
FOLATE SERPL-MCNC: >20 NG/ML
GLUCOSE QUALITATIVE U: NEGATIVE
GLUCOSE SERPL-MCNC: 85 MG/DL
HBA1C MFR BLD HPLC: 5.4 %
HCG SERPL QL: NEGATIVE
HCT VFR BLD CALC: 39.7 %
HDLC SERPL-MCNC: 60 MG/DL
HGB BLD-MCNC: 12.2 G/DL
HYALINE CASTS: 0 /LPF
IMM GRANULOCYTES NFR BLD AUTO: 0.2 %
INR PPP: 1.03 RATIO
IRON SATN MFR SERPL: 27 %
IRON SERPL-MCNC: 88 UG/DL
KETONES URINE: NEGATIVE
LDLC SERPL CALC-MCNC: 103 MG/DL
LEUKOCYTE ESTERASE URINE: NEGATIVE
LYMPHOCYTES # BLD AUTO: 2.25 K/UL
LYMPHOCYTES NFR BLD AUTO: 37.8 %
MAN DIFF?: NORMAL
MCHC RBC-ENTMCNC: 27.9 PG
MCHC RBC-ENTMCNC: 30.7 GM/DL
MCV RBC AUTO: 90.8 FL
MICROSCOPIC-UA: NORMAL
MONOCYTES # BLD AUTO: 0.51 K/UL
MONOCYTES NFR BLD AUTO: 8.6 %
NEUTROPHILS # BLD AUTO: 2.99 K/UL
NEUTROPHILS NFR BLD AUTO: 50.1 %
NITRITE URINE: NEGATIVE
PAPP-A SERPL-ACNC: 3 MIU/ML
PARATHYROID HORMONE INTACT: 55 PG/ML
PH URINE: 8
PLATELET # BLD AUTO: 290 K/UL
POTASSIUM SERPL-SCNC: 3.5 MMOL/L
PREALB SERPL NEPH-MCNC: 24 MG/DL
PROT SERPL-MCNC: 6.8 G/DL
PROTEIN URINE: NEGATIVE
PT BLD: 11.9 SEC
RBC # BLD: 4.37 M/UL
RBC # FLD: 13.2 %
RED BLOOD CELLS URINE: 1 /HPF
SODIUM SERPL-SCNC: 142 MMOL/L
SPECIFIC GRAVITY URINE: 1.01
SQUAMOUS EPITHELIAL CELLS: 0 /HPF
TIBC SERPL-MCNC: 325 UG/DL
TRIGL SERPL-MCNC: 70 MG/DL
TSH SERPL-ACNC: 2.31 UIU/ML
UIBC SERPL-MCNC: 237 UG/DL
UREA BREATH TEST QL: NEGATIVE
UROBILINOGEN URINE: NORMAL
VIT A SERPL-MCNC: 43.7 UG/DL
VIT B1 SERPL-MCNC: 64.4 NMOL/L
VIT B12 SERPL-MCNC: >2000 PG/ML
WBC # FLD AUTO: 5.96 K/UL
WHITE BLOOD CELLS URINE: 5 /HPF
ZINC SERPL-MCNC: 70 UG/DL

## 2020-03-17 ENCOUNTER — APPOINTMENT (OUTPATIENT)
Dept: SURGERY | Facility: CLINIC | Age: 65
End: 2020-03-17
Payer: MEDICAID

## 2020-03-17 DIAGNOSIS — Z98.84 BARIATRIC SURGERY STATUS: ICD-10-CM

## 2020-03-17 PROCEDURE — 99442: CPT

## 2020-03-25 NOTE — HISTORY OF PRESENT ILLNESS
[de-identified] : Pt is a 64 yr old F 9 mo s.p conversion to rygb, who agreed to proceed with phone consultation amid heath. Encounter was translated with the assistance of Silvina. Pt reported feeling well, diet with reviewed with Daisy at length but states she is meeting protein requirements and following diet. States she is walking for exercise, taking vitamins daily as directed. States she is having plenty of water, denies n,v,c,d. Pt to go for labs in may, f/u 3 mo.

## 2022-01-21 ENCOUNTER — NON-APPOINTMENT (OUTPATIENT)
Age: 67
End: 2022-01-21

## 2023-12-12 NOTE — DISCHARGE NOTE PROVIDER - NSDCFUADDINST_GEN_ALL_CORE_FT
Last visit: 12/22/2023  Next visit: 12/11/2023  Medication requested: pregabalin (LYRICA)   Last prescription details:  150 MG capsule , # 90 capsule  with 2 refills.   Patient due for refills.   Sent to provider to review and sign pending order.    Sold: 11/15/2023       
Follow up with  in 1 week. Call the office at  to schedule your appointment. You may shower; soap and water over incision sites. Do not scrub. Pat dry when done. No tub bathing or swimming until cleared. Keep incision sites out of the sun as scars will darken. No heavy lifting (>10lbs) or strenuous exercise. Diet: Bariatric clear Fluids. 60 grams protein daily.  64 fluid ounces water daily. Drink small sips throughout the day. Continue diet as outlined by paperwork received as a pre-operative patient. You should be urinating at least 3-4x per day. Call the office if you experience increasing abdominal pain, nausea, vomiting, or temperature >100.4F.  No NSAIDs until approved by Dr. Sanders. Avoid alcoholic beverages until cleared by Dr. Sanders.     Please take Lovenox injections as directed while in the hospital.